# Patient Record
Sex: MALE | Race: WHITE | NOT HISPANIC OR LATINO | Employment: FULL TIME | URBAN - NONMETROPOLITAN AREA
[De-identification: names, ages, dates, MRNs, and addresses within clinical notes are randomized per-mention and may not be internally consistent; named-entity substitution may affect disease eponyms.]

---

## 2023-11-29 ENCOUNTER — APPOINTMENT (OUTPATIENT)
Dept: CT IMAGING | Facility: HOSPITAL | Age: 48
End: 2023-11-29
Payer: COMMERCIAL

## 2023-11-29 ENCOUNTER — HOSPITAL ENCOUNTER (EMERGENCY)
Facility: HOSPITAL | Age: 48
Discharge: HOME | End: 2023-11-29
Attending: EMERGENCY MEDICINE
Payer: COMMERCIAL

## 2023-11-29 VITALS
TEMPERATURE: 98.4 F | HEIGHT: 70 IN | WEIGHT: 230 LBS | HEART RATE: 80 BPM | DIASTOLIC BLOOD PRESSURE: 74 MMHG | OXYGEN SATURATION: 99 % | SYSTOLIC BLOOD PRESSURE: 153 MMHG | BODY MASS INDEX: 32.93 KG/M2 | RESPIRATION RATE: 18 BRPM

## 2023-11-29 DIAGNOSIS — M54.50 RIGHT LOW BACK PAIN, UNSPECIFIED CHRONICITY, UNSPECIFIED WHETHER SCIATICA PRESENT: Primary | ICD-10-CM

## 2023-11-29 LAB
ALBUMIN SERPL-MCNC: 4.5 G/DL (ref 3.5–5.2)
ALBUMIN/GLOB SERPL: 1.6 G/DL
ALP SERPL-CCNC: 62 U/L (ref 39–117)
ALT SERPL W P-5'-P-CCNC: 46 U/L (ref 1–41)
ANION GAP SERPL CALCULATED.3IONS-SCNC: 11 MMOL/L (ref 5–15)
AST SERPL-CCNC: 29 U/L (ref 1–40)
BASOPHILS # BLD AUTO: 0.07 10*3/MM3 (ref 0–0.2)
BASOPHILS NFR BLD AUTO: 0.7 % (ref 0–1.5)
BILIRUB SERPL-MCNC: 0.5 MG/DL (ref 0–1.2)
BILIRUB UR QL STRIP: NEGATIVE
BUN SERPL-MCNC: 19 MG/DL (ref 6–20)
BUN/CREAT SERPL: 13.8 (ref 7–25)
CALCIUM SPEC-SCNC: 9.8 MG/DL (ref 8.6–10.5)
CHLORIDE SERPL-SCNC: 103 MMOL/L (ref 98–107)
CLARITY UR: CLEAR
CO2 SERPL-SCNC: 25 MMOL/L (ref 22–29)
COLOR UR: YELLOW
CREAT SERPL-MCNC: 1.38 MG/DL (ref 0.76–1.27)
CRP SERPL-MCNC: <0.3 MG/DL (ref 0–0.5)
DEPRECATED RDW RBC AUTO: 48.5 FL (ref 37–54)
EGFRCR SERPLBLD CKD-EPI 2021: 62.7 ML/MIN/1.73
EOSINOPHIL # BLD AUTO: 0.28 10*3/MM3 (ref 0–0.4)
EOSINOPHIL NFR BLD AUTO: 2.7 % (ref 0.3–6.2)
ERYTHROCYTE [DISTWIDTH] IN BLOOD BY AUTOMATED COUNT: 14 % (ref 12.3–15.4)
ERYTHROCYTE [SEDIMENTATION RATE] IN BLOOD: 4 MM/HR (ref 0–15)
GLOBULIN UR ELPH-MCNC: 2.8 GM/DL
GLUCOSE SERPL-MCNC: 120 MG/DL (ref 65–99)
GLUCOSE UR STRIP-MCNC: NEGATIVE MG/DL
HCT VFR BLD AUTO: 56.7 % (ref 37.5–51)
HGB BLD-MCNC: 18.5 G/DL (ref 13–17.7)
HGB UR QL STRIP.AUTO: NEGATIVE
HOLD SPECIMEN: NORMAL
HOLD SPECIMEN: NORMAL
IMM GRANULOCYTES # BLD AUTO: 0.02 10*3/MM3 (ref 0–0.05)
IMM GRANULOCYTES NFR BLD AUTO: 0.2 % (ref 0–0.5)
KETONES UR QL STRIP: NEGATIVE
LEUKOCYTE ESTERASE UR QL STRIP.AUTO: NEGATIVE
LYMPHOCYTES # BLD AUTO: 1.78 10*3/MM3 (ref 0.7–3.1)
LYMPHOCYTES NFR BLD AUTO: 17.1 % (ref 19.6–45.3)
MCH RBC QN AUTO: 30.4 PG (ref 26.6–33)
MCHC RBC AUTO-ENTMCNC: 32.6 G/DL (ref 31.5–35.7)
MCV RBC AUTO: 93.1 FL (ref 79–97)
MONOCYTES # BLD AUTO: 0.6 10*3/MM3 (ref 0.1–0.9)
MONOCYTES NFR BLD AUTO: 5.8 % (ref 5–12)
NEUTROPHILS NFR BLD AUTO: 7.68 10*3/MM3 (ref 1.7–7)
NEUTROPHILS NFR BLD AUTO: 73.5 % (ref 42.7–76)
NITRITE UR QL STRIP: NEGATIVE
NRBC BLD AUTO-RTO: 0 /100 WBC (ref 0–0.2)
PH UR STRIP.AUTO: 6 [PH] (ref 5–8)
PLATELET # BLD AUTO: 203 10*3/MM3 (ref 140–450)
PMV BLD AUTO: 11.2 FL (ref 6–12)
POTASSIUM SERPL-SCNC: 4.1 MMOL/L (ref 3.5–5.2)
PROT SERPL-MCNC: 7.3 G/DL (ref 6–8.5)
PROT UR QL STRIP: NEGATIVE
RBC # BLD AUTO: 6.09 10*6/MM3 (ref 4.14–5.8)
SODIUM SERPL-SCNC: 139 MMOL/L (ref 136–145)
SP GR UR STRIP: 1.02 (ref 1–1.03)
UROBILINOGEN UR QL STRIP: NORMAL
WBC NRBC COR # BLD AUTO: 10.43 10*3/MM3 (ref 3.4–10.8)
WHOLE BLOOD HOLD COAG: NORMAL
WHOLE BLOOD HOLD SPECIMEN: NORMAL

## 2023-11-29 PROCEDURE — 72131 CT LUMBAR SPINE W/O DYE: CPT

## 2023-11-29 PROCEDURE — 85652 RBC SED RATE AUTOMATED: CPT | Performed by: PHYSICIAN ASSISTANT

## 2023-11-29 PROCEDURE — 80053 COMPREHEN METABOLIC PANEL: CPT | Performed by: PHYSICIAN ASSISTANT

## 2023-11-29 PROCEDURE — 96372 THER/PROPH/DIAG INJ SC/IM: CPT

## 2023-11-29 PROCEDURE — 81003 URINALYSIS AUTO W/O SCOPE: CPT | Performed by: PHYSICIAN ASSISTANT

## 2023-11-29 PROCEDURE — 72131 CT LUMBAR SPINE W/O DYE: CPT | Performed by: RADIOLOGY

## 2023-11-29 PROCEDURE — 86140 C-REACTIVE PROTEIN: CPT | Performed by: PHYSICIAN ASSISTANT

## 2023-11-29 PROCEDURE — 85025 COMPLETE CBC W/AUTO DIFF WBC: CPT | Performed by: PHYSICIAN ASSISTANT

## 2023-11-29 PROCEDURE — 36415 COLL VENOUS BLD VENIPUNCTURE: CPT

## 2023-11-29 PROCEDURE — 74176 CT ABD & PELVIS W/O CONTRAST: CPT

## 2023-11-29 PROCEDURE — 25010000002 KETOROLAC TROMETHAMINE PER 15 MG: Performed by: EMERGENCY MEDICINE

## 2023-11-29 PROCEDURE — 63710000001 ONDANSETRON ODT 4 MG TABLET DISPERSIBLE: Performed by: EMERGENCY MEDICINE

## 2023-11-29 PROCEDURE — 99284 EMERGENCY DEPT VISIT MOD MDM: CPT

## 2023-11-29 PROCEDURE — 25010000002 MORPHINE PER 10 MG: Performed by: EMERGENCY MEDICINE

## 2023-11-29 PROCEDURE — 74176 CT ABD & PELVIS W/O CONTRAST: CPT | Performed by: RADIOLOGY

## 2023-11-29 RX ORDER — ONDANSETRON 4 MG/1
4 TABLET, ORALLY DISINTEGRATING ORAL ONCE
Status: COMPLETED | OUTPATIENT
Start: 2023-11-29 | End: 2023-11-29

## 2023-11-29 RX ORDER — KETOROLAC TROMETHAMINE 30 MG/ML
30 INJECTION, SOLUTION INTRAMUSCULAR; INTRAVENOUS ONCE
Status: COMPLETED | OUTPATIENT
Start: 2023-11-29 | End: 2023-11-29

## 2023-11-29 RX ORDER — ORPHENADRINE CITRATE 100 MG/1
100 TABLET, EXTENDED RELEASE ORAL 2 TIMES DAILY PRN
Qty: 14 TABLET | Refills: 0 | Status: SHIPPED | OUTPATIENT
Start: 2023-11-29

## 2023-11-29 RX ADMIN — KETOROLAC TROMETHAMINE 30 MG: 30 INJECTION, SOLUTION INTRAMUSCULAR; INTRAVENOUS at 16:16

## 2023-11-29 RX ADMIN — ONDANSETRON 4 MG: 4 TABLET, ORALLY DISINTEGRATING ORAL at 16:16

## 2023-11-29 RX ADMIN — MORPHINE SULFATE 4 MG: 4 INJECTION, SOLUTION INTRAMUSCULAR; INTRAVENOUS at 16:16

## 2023-11-29 NOTE — Clinical Note
[facility] EMERGENCY DEPARTMENT  [address] [facility] [address]  Phone: [phone]    Ronni Salinas was seen and treated in our emergency department on 11/29/2023.  He may return to work on 12/01/2023.         Thank you for choosing [facility].    Ronaldo Finley PA-C

## 2023-11-29 NOTE — ED PROVIDER NOTES
Subjective   History of Present Illness  48-year-old male with no known past medical history presents to the emergency room with lower right-sided back pain for the past several months and worsening over the past couple of weeks.  Patient states the pain is so severe at night he is unable to sleep secondary to this pain.  He states upon lying down is when the pain starts, however when he is up and moving throughout the day the pain gets better.  He denies any previous back surgeries or trauma.  He denies any urinary retention, fecal incontinence, saddle anesthesia, or lower extremity weakness.  Denies any history of nephrolithiasis or hematuria.  Aggravating factors include lying down.  Alleviating after his include movement.  Does report to taking steroids and NSAIDs, however states that does not necessarily help his pain.  Denies any other complaints or concerns at this time.    History provided by:  Patient   used: No        Review of Systems   Constitutional: Negative.  Negative for fever.   HENT: Negative.     Respiratory: Negative.     Cardiovascular: Negative.  Negative for chest pain.   Gastrointestinal: Negative.  Negative for abdominal pain.   Endocrine: Negative.    Genitourinary: Negative.  Negative for dysuria.   Musculoskeletal:  Positive for back pain.   Skin: Negative.    Neurological: Negative.    Psychiatric/Behavioral: Negative.     All other systems reviewed and are negative.      No past medical history on file.    Allergies   Allergen Reactions    Codeine Shortness Of Breath    Penicillins Other (See Comments)     Childhood allergy       No past surgical history on file.    No family history on file.    Social History     Socioeconomic History    Marital status:            Objective   Physical Exam  Vitals and nursing note reviewed.   Constitutional:       General: He is not in acute distress.     Appearance: He is well-developed. He is not diaphoretic.   HENT:       Head: Normocephalic and atraumatic.      Right Ear: External ear normal.      Left Ear: External ear normal.      Nose: Nose normal.   Eyes:      Conjunctiva/sclera: Conjunctivae normal.      Pupils: Pupils are equal, round, and reactive to light.   Neck:      Vascular: No JVD.      Trachea: No tracheal deviation.   Cardiovascular:      Rate and Rhythm: Normal rate and regular rhythm.      Heart sounds: Normal heart sounds. No murmur heard.  Pulmonary:      Effort: Pulmonary effort is normal. No respiratory distress.      Breath sounds: Normal breath sounds. No wheezing.   Abdominal:      General: Bowel sounds are normal. There is no distension.      Palpations: Abdomen is soft.      Tenderness: There is no abdominal tenderness. There is right CVA tenderness. There is no left CVA tenderness.   Musculoskeletal:         General: No deformity. Normal range of motion.      Cervical back: Normal, normal range of motion and neck supple.      Thoracic back: Normal.      Lumbar back: Tenderness present. Negative right straight leg raise test and negative left straight leg raise test.        Back:    Skin:     General: Skin is warm and dry.      Coloration: Skin is not pale.      Findings: No erythema or rash.   Neurological:      Mental Status: He is alert and oriented to person, place, and time.      Cranial Nerves: No cranial nerve deficit.   Psychiatric:         Behavior: Behavior normal.         Thought Content: Thought content normal.         Procedures           ED Course  ED Course as of 11/29/23 1841 Wed Nov 29, 2023   1637 CT Abdomen Pelvis Without Contrast [TK]   1637 CT Lumbar Spine Without Contrast [TK]      ED Course User Index  [TK] Ronaldo Finley PA-C                                   Results for orders placed or performed during the hospital encounter of 11/29/23   Comprehensive Metabolic Panel    Specimen: Hand, Right; Blood   Result Value Ref Range    Glucose 120 (H) 65 - 99 mg/dL    BUN 19 6 - 20  mg/dL    Creatinine 1.38 (H) 0.76 - 1.27 mg/dL    Sodium 139 136 - 145 mmol/L    Potassium 4.1 3.5 - 5.2 mmol/L    Chloride 103 98 - 107 mmol/L    CO2 25.0 22.0 - 29.0 mmol/L    Calcium 9.8 8.6 - 10.5 mg/dL    Total Protein 7.3 6.0 - 8.5 g/dL    Albumin 4.5 3.5 - 5.2 g/dL    ALT (SGPT) 46 (H) 1 - 41 U/L    AST (SGOT) 29 1 - 40 U/L    Alkaline Phosphatase 62 39 - 117 U/L    Total Bilirubin 0.5 0.0 - 1.2 mg/dL    Globulin 2.8 gm/dL    A/G Ratio 1.6 g/dL    BUN/Creatinine Ratio 13.8 7.0 - 25.0    Anion Gap 11.0 5.0 - 15.0 mmol/L    eGFR 62.7 >60.0 mL/min/1.73   Urinalysis With Culture If Indicated - Urine, Clean Catch    Specimen: Urine, Clean Catch   Result Value Ref Range    Color, UA Yellow Yellow, Straw    Appearance, UA Clear Clear    pH, UA 6.0 5.0 - 8.0    Specific Gravity, UA 1.018 1.005 - 1.030    Glucose, UA Negative Negative    Ketones, UA Negative Negative    Bilirubin, UA Negative Negative    Blood, UA Negative Negative    Protein, UA Negative Negative    Leuk Esterase, UA Negative Negative    Nitrite, UA Negative Negative    Urobilinogen, UA 1.0 E.U./dL 0.2 - 1.0 E.U./dL   C-reactive Protein    Specimen: Hand, Right; Blood   Result Value Ref Range    C-Reactive Protein <0.30 0.00 - 0.50 mg/dL   Sedimentation Rate    Specimen: Hand, Right; Blood   Result Value Ref Range    Sed Rate 4 0 - 15 mm/hr   CBC Auto Differential    Specimen: Hand, Right; Blood   Result Value Ref Range    WBC 10.43 3.40 - 10.80 10*3/mm3    RBC 6.09 (H) 4.14 - 5.80 10*6/mm3    Hemoglobin 18.5 (H) 13.0 - 17.7 g/dL    Hematocrit 56.7 (H) 37.5 - 51.0 %    MCV 93.1 79.0 - 97.0 fL    MCH 30.4 26.6 - 33.0 pg    MCHC 32.6 31.5 - 35.7 g/dL    RDW 14.0 12.3 - 15.4 %    RDW-SD 48.5 37.0 - 54.0 fl    MPV 11.2 6.0 - 12.0 fL    Platelets 203 140 - 450 10*3/mm3    Neutrophil % 73.5 42.7 - 76.0 %    Lymphocyte % 17.1 (L) 19.6 - 45.3 %    Monocyte % 5.8 5.0 - 12.0 %    Eosinophil % 2.7 0.3 - 6.2 %    Basophil % 0.7 0.0 - 1.5 %    Immature Grans %  0.2 0.0 - 0.5 %    Neutrophils, Absolute 7.68 (H) 1.70 - 7.00 10*3/mm3    Lymphocytes, Absolute 1.78 0.70 - 3.10 10*3/mm3    Monocytes, Absolute 0.60 0.10 - 0.90 10*3/mm3    Eosinophils, Absolute 0.28 0.00 - 0.40 10*3/mm3    Basophils, Absolute 0.07 0.00 - 0.20 10*3/mm3    Immature Grans, Absolute 0.02 0.00 - 0.05 10*3/mm3    nRBC 0.0 0.0 - 0.2 /100 WBC   Green Top (Gel)   Result Value Ref Range    Extra Tube Hold for add-ons.    Lavender Top   Result Value Ref Range    Extra Tube hold for add-on    Gold Top - SST   Result Value Ref Range    Extra Tube Hold for add-ons.    Light Blue Top   Result Value Ref Range    Extra Tube Hold for add-ons.        CT Lumbar Spine Without Contrast   Final Result     No acute findings in the lumbar spine.           This report was finalized on 11/29/2023 4:12 PM by Dr. Pool Sunshine MD.          CT Abdomen Pelvis Without Contrast   Final Result     No acute findings in the abdomen or pelvis.           This report was finalized on 11/29/2023 4:11 PM by Dr. Pool Sunshine MD.                        Medical Decision Making  48-year-old male with no known past medical history presents to the emergency room with lower right-sided back pain for the past several months and worsening over the past couple of weeks.  Patient states the pain is so severe at night he is unable to sleep secondary to this pain.  He states upon lying down is when the pain starts, however when he is up and moving throughout the day the pain gets better.  He denies any previous back surgeries or trauma.  He denies any urinary retention, fecal incontinence, saddle anesthesia, or lower extremity weakness.  Denies any history of nephrolithiasis or hematuria.  Aggravating factors include lying down.  Alleviating after his include movement.  Does report to taking steroids and NSAIDs, however states that does not necessarily help his pain.  Denies any other complaints or concerns at this time.      Problems  Addressed:  Right low back pain, unspecified chronicity, unspecified whether sciatica present: complicated acute illness or injury    Amount and/or Complexity of Data Reviewed  Labs: ordered. Decision-making details documented in ED Course.  Radiology: ordered. Decision-making details documented in ED Course.    Risk  Prescription drug management.        Final diagnoses:   Right low back pain, unspecified chronicity, unspecified whether sciatica present       ED Disposition  ED Disposition       ED Disposition   Discharge    Condition   Stable    Comment   --               Nikhil Forrest MD  [address] [place] [address] [address] [place] [address] [phone]    In 2 days           Medication List        New Prescriptions      orphenadrine 100 MG 12 hr tablet  Commonly known as: NORFLEX  Take 1 tablet by mouth 2 (Two) Times a Day As Needed for Muscle Spasms or Mild Pain.               Where to Get Your Medications        These medications were sent to [id number] - [place], [place] - [address] [facility] - [phone]  - [phone]   [address] [place] [address]      Phone: [phone]   orphenadrine 100 MG 12 hr tablet            Ronaldo Finley, PAHiramC  11/29/23 4407

## 2024-10-07 ENCOUNTER — HOSPITAL ENCOUNTER (OUTPATIENT)
Dept: GENERAL RADIOLOGY | Facility: HOSPITAL | Age: 49
Discharge: HOME | End: 2024-10-07
Admitting: FAMILY MEDICINE
Payer: COMMERCIAL

## 2024-10-07 ENCOUNTER — OFFICE VISIT (OUTPATIENT)
Dept: ORTHOPEDIC SURGERY | Facility: CLINIC | Age: 49
End: 2024-10-07
Payer: COMMERCIAL

## 2024-10-07 VITALS
DIASTOLIC BLOOD PRESSURE: 89 MMHG | SYSTOLIC BLOOD PRESSURE: 130 MMHG | WEIGHT: 259.8 LBS | OXYGEN SATURATION: 98 % | BODY MASS INDEX: 37.19 KG/M2 | HEART RATE: 64 BPM | HEIGHT: 70 IN

## 2024-10-07 DIAGNOSIS — M25.511 CHRONIC RIGHT SHOULDER PAIN: Primary | ICD-10-CM

## 2024-10-07 DIAGNOSIS — S46.011A TRAUMATIC TEAR OF RIGHT ROTATOR CUFF, UNSPECIFIED TEAR EXTENT, INITIAL ENCOUNTER: ICD-10-CM

## 2024-10-07 DIAGNOSIS — M25.511 RIGHT SHOULDER PAIN, UNSPECIFIED CHRONICITY: ICD-10-CM

## 2024-10-07 DIAGNOSIS — G89.29 CHRONIC RIGHT SHOULDER PAIN: Primary | ICD-10-CM

## 2024-10-07 DIAGNOSIS — M75.51 CHRONIC SHOULDER BURSITIS, RIGHT: ICD-10-CM

## 2024-10-07 DIAGNOSIS — M25.611 SHOULDER JOINT STIFFNESS, RIGHT: ICD-10-CM

## 2024-10-07 PROBLEM — I10 HTN (HYPERTENSION): Status: ACTIVE | Noted: 2024-10-07

## 2024-10-07 PROBLEM — E03.9 HYPOTHYROIDISM (ACQUIRED): Status: ACTIVE | Noted: 2024-10-07

## 2024-10-07 PROBLEM — K44.9 HIATAL HERNIA: Status: ACTIVE | Noted: 2024-10-07

## 2024-10-07 PROBLEM — E78.5 DYSLIPIDEMIA: Status: ACTIVE | Noted: 2024-10-07

## 2024-10-07 PROBLEM — K21.9 ACID REFLUX: Status: ACTIVE | Noted: 2024-10-07

## 2024-10-07 PROBLEM — F43.22 ADJUSTMENT DISORDER WITH ANXIETY: Status: ACTIVE | Noted: 2024-10-07

## 2024-10-07 PROCEDURE — 99204 OFFICE O/P NEW MOD 45 MIN: CPT | Performed by: FAMILY MEDICINE

## 2024-10-07 PROCEDURE — 20610 DRAIN/INJ JOINT/BURSA W/O US: CPT | Performed by: FAMILY MEDICINE

## 2024-10-07 PROCEDURE — 73030 X-RAY EXAM OF SHOULDER: CPT

## 2024-10-07 RX ORDER — NAPROXEN 500 MG/1
500 TABLET ORAL 2 TIMES DAILY PRN
Qty: 60 TABLET | Refills: 1 | Status: SHIPPED | OUTPATIENT
Start: 2024-10-07

## 2024-10-07 RX ORDER — LEVOTHYROXINE SODIUM 50 UG/1
100 CAPSULE ORAL
COMMUNITY

## 2024-10-07 RX ORDER — DIAZEPAM 10 MG
5-10 TABLET ORAL 2 TIMES DAILY PRN
COMMUNITY

## 2024-10-07 RX ORDER — LISINOPRIL 20 MG/1
TABLET ORAL
COMMUNITY
Start: 2024-08-29

## 2024-10-07 RX ORDER — ATORVASTATIN CALCIUM 20 MG/1
20 TABLET, FILM COATED ORAL DAILY
COMMUNITY

## 2024-10-07 RX ORDER — METHYLPREDNISOLONE ACETATE 80 MG/ML
80 INJECTION, SUSPENSION INTRA-ARTICULAR; INTRALESIONAL; INTRAMUSCULAR; SOFT TISSUE
Status: COMPLETED | OUTPATIENT
Start: 2024-10-07 | End: 2024-10-07

## 2024-10-07 RX ORDER — LIDOCAINE HYDROCHLORIDE 10 MG/ML
5 INJECTION, SOLUTION EPIDURAL; INFILTRATION; INTRACAUDAL; PERINEURAL
Status: COMPLETED | OUTPATIENT
Start: 2024-10-07 | End: 2024-10-07

## 2024-10-07 RX ORDER — CELECOXIB 200 MG/1
200 CAPSULE ORAL DAILY
COMMUNITY
End: 2024-10-07 | Stop reason: ALTCHOICE

## 2024-10-07 RX ORDER — OMEPRAZOLE 40 MG/1
CAPSULE, DELAYED RELEASE ORAL
COMMUNITY
Start: 2024-09-30

## 2024-10-07 RX ADMIN — LIDOCAINE HYDROCHLORIDE 5 ML: 10 INJECTION, SOLUTION EPIDURAL; INFILTRATION; INTRACAUDAL; PERINEURAL at 16:39

## 2024-10-07 RX ADMIN — METHYLPREDNISOLONE ACETATE 80 MG: 80 INJECTION, SUSPENSION INTRA-ARTICULAR; INTRALESIONAL; INTRAMUSCULAR; SOFT TISSUE at 16:39

## 2024-10-07 NOTE — PROGRESS NOTES
New Patient Visit      Patient: Ronni Salinas  YOB: 1975  Date of Encounter: 10/07/2024  PCP: Kim Best APRN  Referring Provider: NIURKA Naidu     Subjective   Ronni Salinas is a 48 y.o. male who presents to the office today for evaluation of Initial Evaluation and Pain of the Right Shoulder      Chief Complaint   Patient presents with    Right Shoulder - Initial Evaluation, Pain       History of Present Illness  The patient presents for evaluation of right shoulder pain.    He experiences pain in his right shoulder when lifting or rotating it externally. This discomfort has been present since he was 11 years old, when he injured his arm while playing [employer] league. Over the years, the pain has progressively worsened, limiting his ability to throw a ball. He reports daily pain despite avoiding activities that exacerbate his shoulder pain, such as arm wrestling or throwing a ball.    He has sought medical attention for this issue since his youth. Initial diagnoses suggested a pulled muscle. However, an MRI conducted at Dr. Harris' office when he was much younger revealed a torn rotator cuff. Another MRI performed by Dr. Dubin in [place] identified bone spurs about 12 years ago. He has received cortisone injections every three months, which sometimes provide immediate relief and other times take several hours or even a day to alleviate the pain. These injections typically offer about three weeks of relief.    He also reports elbow pain and neck soreness, which he attributes to his shoulder pain. He has not undergone physical therapy for his arm but has tried ibuprofen and Celebrex, neither of which provided significant relief. Pain medications have caused him stomach discomfort, necessitating the use of Zofran.    He has been diagnosed with lipomas in his back and head, which are being monitored. He is currently taking thyroid medication and has a history of blood pressure and cholesterol  issues, as well as low thyroid function. He has also experienced anxiety for several years.    Patient Active Problem List   Diagnosis    HTN (hypertension)    Hypothyroidism (acquired)    Dyslipidemia    Adjustment disorder with anxiety    Hiatal hernia    Acid reflux       Past Medical History:   Diagnosis Date    Acid reflux     Hiatal hernia     High cholesterol     Hypertension     Thyroid condition        Past Surgical History:   Procedure Laterality Date    CARDIAC CATHETERIZATION      CARPAL TUNNEL RELEASE Right     ENDOSCOPY      FOOT SURGERY      HAND SURGERY Right     TONSILLECTOMY         History reviewed. No pertinent family history.    Social History     Socioeconomic History    Marital status:    Tobacco Use    Smoking status: Never    Smokeless tobacco: Current     Types: Snuff   Vaping Use    Vaping status: Never Used   Substance and Sexual Activity    Alcohol use: Yes    Drug use: Never    Sexual activity: Defer       Current Outpatient Medications   Medication Sig Dispense Refill    atorvastatin (LIPITOR) 20 MG tablet Take 1 tablet by mouth Daily.      diazePAM (VALIUM) 10 MG tablet Take 0.5-1 tablets by mouth 2 (Two) Times a Day As Needed.      levothyroxine sodium (TIROSINT) 50 MCG capsule Take 2 capsules by mouth.      lisinopril (PRINIVIL,ZESTRIL) 20 MG tablet Take 1 tablet every day by oral route for 30 days, for BP.      omeprazole (priLOSEC) 40 MG capsule Take by oral route for 30 days.      naproxen (NAPROSYN) 500 MG tablet Take 1 tablet by mouth 2 (Two) Times a Day As Needed for Moderate Pain. With food 60 tablet 1    orphenadrine (NORFLEX) 100 MG 12 hr tablet Take 1 tablet by mouth 2 (Two) Times a Day As Needed for Muscle Spasms or Mild Pain. (Patient not taking: Reported on 10/7/2024) 14 tablet 0     No current facility-administered medications for this visit.       Allergies   Allergen Reactions    Codeine Shortness Of Breath    Penicillins Other (See Comments)     Childhood  "allergy       Vitals:   /89 (BP Location: Left arm, Patient Position: Sitting, Cuff Size: Adult)   Pulse 64   Ht 177.8 cm (70\")   Wt 118 kg (259 lb 12.8 oz)   SpO2 98%   BMI 37.28 kg/m²           Visit Vitals  /89 (BP Location: Left arm, Patient Position: Sitting, Cuff Size: Adult)   Pulse 64   Ht 177.8 cm (70\")   Wt 118 kg (259 lb 12.8 oz)   SpO2 98%   BMI 37.28 kg/m²     48 y.o.male     Review of Systems   Constitutional:  Positive for activity change. Negative for fever.   Respiratory:  Negative for shortness of breath and wheezing.    Cardiovascular:  Negative for chest pain.   Musculoskeletal:  Positive for arthralgias, myalgias and neck pain.   Skin:  Negative for color change and wound.   Neurological:  Negative for weakness and numbness.       Physical Exam  Vitals and nursing note reviewed.   Constitutional:       General: He is not in acute distress.     Appearance: Normal appearance.   Pulmonary:      Effort: Pulmonary effort is normal. No respiratory distress.   Musculoskeletal:      Cervical back: Spasms and tenderness present. No bony tenderness. No pain with movement. Decreased range of motion.      Comments: Negative Spurling maneuver   Skin:     General: Skin is warm and dry.      Findings: No erythema.   Neurological:      General: No focal deficit present.      Mental Status: He is alert.      Sensory: No sensory deficit.      Motor: No weakness.       Physical Exam  Right shoulder exam reveals tenderness to palpation over coracoid and anterior shoulder joint. Tenderness is noted on posterior joint and rotator cuff interval. Restricted active range of motion with pain in all directions, essentially full passive flexion and restricted abduction and external rotation with pain. Pain without weakness on testing of supraspinatus. Severe pain is observed on [place]'s sign. Mild pain on testing of infraspinatus. Painless testing of subscapularis, biceps, and triceps. Pain is noted on " the AC crossover. Painless impingement signs, however, significant pain on bringing the arm back down to neutral position. Negative Speed's test. Neurovascularly intact arm with intact pinch  and intrinsic strength, 2+ radial pulses and intact deep tendon reflexes. Intact sensation.    Radiology Results:    XR Shoulder 2+ View Right  Narrative: EXAM:    XR Right Shoulder Complete, 2 or More Views     EXAM DATE:    10/7/2024 10:29 AM     CLINICAL HISTORY:    right shoulder pain; M25.511-Pain in right shoulder     TECHNIQUE:    Two or more views of the right shoulder.     COMPARISON:    No relevant prior studies available.     FINDINGS:    BONES/JOINTS:  Unremarkable.  No acute fracture.  No dislocation.    SOFT TISSUES:  Unremarkable.     Impression:   No acute findings in the right shoulder.        This report was finalized on 10/7/2024 10:55 AM by Dr. Sherman Dahl MD.       - Large Joint Arthrocentesis: R subacromial bursa on 10/7/2024 4:39 PM  Indications: pain and diagnostic evaluation  Details: 22 G needle, posterior approach  Medications: 5 mL lidocaine PF 1% 1 %; 80 mg methylPREDNISolone acetate 80 MG/ML  Outcome: tolerated well, no immediate complications    Injection site in the posterior shoulder was cleaned with alcohol and iodine.  Anesthesia with ethyl chloride.  Then using sterile technique the subacromial space was accessed with a 22-gauge 1.5 inch needle injected with 5 cc 1% lidocaine without epinephrine and 80 mg methylprednisolone.  Injection site was covered with sterile bandage.  There were no immediate adverse effects and negligible blood loss..  Follow-up care and precautions were discussed.      Procedure, treatment alternatives, risks and benefits explained, specific risks discussed. Consent was given by the patient. Immediately prior to procedure a time out was called to verify the correct patient, procedure, equipment, support staff and site/side marked as required. Patient was prepped  and draped in the usual sterile fashion.          Results  Imaging  X-rays of the right shoulder showed no significant arthritic change, but some calcifications were observed in the tendons.     Diagnoses and all orders for this visit:    1. Chronic right shoulder pain (Primary)  -     XR Shoulder 2+ View Right; Future  -     Ambulatory Referral to Physical Therapy for Evaluation & Treatment  -     naproxen (NAPROSYN) 500 MG tablet; Take 1 tablet by mouth 2 (Two) Times a Day As Needed for Moderate Pain. With food  Dispense: 60 tablet; Refill: 1    2. Traumatic tear of right rotator cuff, unspecified tear extent, initial encounter  -     Ambulatory Referral to Physical Therapy for Evaluation & Treatment  -     naproxen (NAPROSYN) 500 MG tablet; Take 1 tablet by mouth 2 (Two) Times a Day As Needed for Moderate Pain. With food  Dispense: 60 tablet; Refill: 1    3. Shoulder joint stiffness, right  -     Ambulatory Referral to Physical Therapy for Evaluation & Treatment  -     naproxen (NAPROSYN) 500 MG tablet; Take 1 tablet by mouth 2 (Two) Times a Day As Needed for Moderate Pain. With food  Dispense: 60 tablet; Refill: 1    4. Chronic shoulder bursitis, right  -     Ambulatory Referral to Physical Therapy for Evaluation & Treatment  -     naproxen (NAPROSYN) 500 MG tablet; Take 1 tablet by mouth 2 (Two) Times a Day As Needed for Moderate Pain. With food  Dispense: 60 tablet; Refill: 1    Other orders  -     - Large Joint Arthrocentesis        Assessment & Plan  1 chronic right shoulder pain likely related to combination of rotator cuff tendinitis, partial tearing, tendon calcifications and possibly labrum injury.  Discussed treatment options and patient will participate in physical therapy as well as a home exercise program I have given him.  Will discontinue Celebrex and try naproxen instead but patient does have borderline low renal function.  He is due to get new labs this week and will bring a copy of them.  His  dose may need to be adjusted or discontinued entirely depending on renal function.  Patient received a steroid injection of the right shoulder with Depo-Medrol with improvement in pain and will follow-up in 2 weeks for reevaluation.            Discussion:    MEDS ORDERED DURING VISIT:  New Medications Ordered This Visit   Medications    naproxen (NAPROSYN) 500 MG tablet     Sig: Take 1 tablet by mouth 2 (Two) Times a Day As Needed for Moderate Pain. With food     Dispense:  60 tablet     Refill:  1     MEDICATION ISSUES:  Discussed medication options and treatment plan of prescribed medication as well as the risks, benefits, and side effects including potential falls, possible impaired driving and metabolic adversities among others. Patient is agreeable to call the office with any worsening of symptoms or onset of side effects. Patient is agreeable to call 911 or go to the nearest ER should he/she begin having SI/HI.         This document has been electronically signed by Valdez Saab DO   October 7, 2024 16:41 EDT    Patient or patient representative verbalized consent for the use of Ambient Listening during the visit with  Valdez Saab DO for chart documentation. 10/7/2024  16:41 EDT

## 2024-10-11 ENCOUNTER — PATIENT ROUNDING (BHMG ONLY) (OUTPATIENT)
Dept: ORTHOPEDIC SURGERY | Facility: CLINIC | Age: 49
End: 2024-10-11
Payer: COMMERCIAL

## 2024-10-11 NOTE — PROGRESS NOTES
October 11, 2024    Hello, may I speak with Ronni Salinas?    My name is LIZBETH      I am  with [facility]  [address]  [facility] [address] [phone].    Before we get started may I verify your date of birth? 1975    I am calling to officially welcome you to our practice and ask about your recent visit. Is this a good time to talk? yes    Tell me about your visit with us. What things went well?  ALL WAS GREAT, NO COMPLAINTS       We're always looking for ways to make our patients' experiences even better. Do you have recommendations on ways we may improve?  no    Overall were you satisfied with your first visit to our practice? yes       I appreciate you taking the time to speak with me today. Is there anything else I can do for you? no      Thank you, and have a great day.

## 2024-10-24 ENCOUNTER — OFFICE VISIT (OUTPATIENT)
Dept: ORTHOPEDIC SURGERY | Facility: CLINIC | Age: 49
End: 2024-10-24
Payer: COMMERCIAL

## 2024-10-24 VITALS
DIASTOLIC BLOOD PRESSURE: 94 MMHG | BODY MASS INDEX: 37.09 KG/M2 | HEART RATE: 82 BPM | HEIGHT: 70 IN | SYSTOLIC BLOOD PRESSURE: 138 MMHG | OXYGEN SATURATION: 94 % | WEIGHT: 259.12 LBS

## 2024-10-24 DIAGNOSIS — M25.511 CHRONIC RIGHT SHOULDER PAIN: ICD-10-CM

## 2024-10-24 DIAGNOSIS — G89.29 CHRONIC RIGHT SHOULDER PAIN: ICD-10-CM

## 2024-10-24 DIAGNOSIS — M25.611 SHOULDER JOINT STIFFNESS, RIGHT: ICD-10-CM

## 2024-10-24 DIAGNOSIS — M75.51 CHRONIC SHOULDER BURSITIS, RIGHT: ICD-10-CM

## 2024-10-24 DIAGNOSIS — S46.011A TRAUMATIC TEAR OF RIGHT ROTATOR CUFF, UNSPECIFIED TEAR EXTENT, INITIAL ENCOUNTER: Primary | ICD-10-CM

## 2024-10-24 NOTE — PROGRESS NOTES
"Follow Up Visit      Patient: Ronni Salinas  YOB: 1975  Date of Encounter: 10/24/2024  PCP: Kim Best APRN  Referring Provider: No ref. provider found     Subjective   Ronni Salinas is a 49 y.o. male who presents to the office today for evaluation of Pain and Follow-up of the Right Shoulder      Chief Complaint   Patient presents with    Right Shoulder - Pain, Follow-up       HPI  Patient presents for follow-up for chronic right shoulder pain.  Reports that the injection numbed him up for the day but was overall of no help.  He has been attempting physical therapy 3 times a week but states that it flares him up badly after every treatment.  States that as long as he keeps his arm below shoulder height he is usually okay but becomes severe with any elevation above shoulder height or any extension  Patient Active Problem List   Diagnosis    HTN (hypertension)    Hypothyroidism (acquired)    Dyslipidemia    Adjustment disorder with anxiety    Hiatal hernia    Acid reflux       Past Medical History:   Diagnosis Date    Acid reflux     Hiatal hernia     High cholesterol     Hypertension     Thyroid condition        Allergies   Allergen Reactions    Codeine Shortness Of Breath    Penicillins Other (See Comments)     Childhood allergy       Vitals:   /94 (BP Location: Left arm, Patient Position: Sitting, Cuff Size: Adult)   Pulse 82   Ht 177.8 cm (70\")   Wt 118 kg (259 lb 1.9 oz)   SpO2 94%   BMI 37.18 kg/m²               Visit Vitals  /94 (BP Location: Left arm, Patient Position: Sitting, Cuff Size: Adult)   Pulse 82   Ht 177.8 cm (70\")   Wt 118 kg (259 lb 1.9 oz)   SpO2 94%   BMI 37.18 kg/m²     49 y.o.male  Physical Exam  Vitals and nursing note reviewed.   Constitutional:       General: He is not in acute distress.     Appearance: Normal appearance. He is obese.   Pulmonary:      Effort: Pulmonary effort is normal. No respiratory distress.   Musculoskeletal:      Comments: Right " shoulder generally restricted range of motion in external rotation flexion abduction with pain as he transitions from below to above the shoulder.  Severe pain on [place] sign.  Mild pain on Corea test.  Painless testing of rotator cuff biceps and triceps.  Neurovascular intact arm   Skin:     General: Skin is warm and dry.      Findings: No erythema.   Neurological:      General: No focal deficit present.      Mental Status: He is alert.      Sensory: No sensory deficit.      Motor: No weakness.         Radiology Results:    XR Shoulder 2+ View Right    Result Date: 10/7/2024    No acute findings in the right shoulder.   This report was finalized on 10/7/2024 10:55 AM by Dr. Sherman Dahl MD.       Assessment & Plan   Diagnoses and all orders for this visit:    1. Traumatic tear of right rotator cuff, unspecified tear extent, initial encounter (Primary)  -     MRI Shoulder Right Without Contrast; Future    2. Chronic right shoulder pain  -     MRI Shoulder Right Without Contrast; Future    3. Chronic shoulder bursitis, right  -     MRI Shoulder Right Without Contrast; Future    4. Shoulder joint stiffness, right  -     MRI Shoulder Right Without Contrast; Future         MEDS ORDERED DURING VISIT:  No orders of the defined types were placed in this encounter.    MEDICATION ISSUES:  Discussed medication options and treatment plan of prescribed medication as well as the risks, benefits, and side effects including potential falls, possible impaired driving and metabolic adversities among others. Patient is agreeable to call the office with any worsening of symptoms or onset of side effects. Patient is agreeable to call 911 or go to the nearest ER should he/she begin having SI/HI.     Discussion:  Patient has a known rotator cuff tear in the right shoulder from when he was much younger and we have been unable to get any of his previous MRI results to confirm this.  However the patient continues to have significant  shoulder pain stiffness and limits to ADLs causing him to be unable to work with his arm above shoulder level or with extended due to severe pain.  His exam is very concerning for labrum tear.  NSAIDs, subacromial steroid injection, or of no help and PT is actually making him flared up badly after each treatment and he is not improving.  At this point he needs a new MRI to fully evaluate the shoulder for possible surgical event intervention.  Patient will pause his physical therapy until after the MRI             This document has been electronically signed by Valdez Saab DO   October 24, 2024 15:03 EDT    Dictated Utilizing Dragon Dictation: Part of this note may be an electronic transcription/translation of spoken language to printed text using the Dragon Dictation System.

## 2024-11-06 ENCOUNTER — TELEPHONE (OUTPATIENT)
Dept: ORTHOPEDIC SURGERY | Facility: CLINIC | Age: 49
End: 2024-11-06
Payer: COMMERCIAL

## 2024-11-06 ENCOUNTER — HOSPITAL ENCOUNTER (OUTPATIENT)
Dept: MRI IMAGING | Facility: HOSPITAL | Age: 49
Discharge: HOME | End: 2024-11-06
Admitting: FAMILY MEDICINE
Payer: COMMERCIAL

## 2024-11-06 DIAGNOSIS — M75.51 CHRONIC SHOULDER BURSITIS, RIGHT: ICD-10-CM

## 2024-11-06 DIAGNOSIS — G89.29 CHRONIC RIGHT SHOULDER PAIN: ICD-10-CM

## 2024-11-06 DIAGNOSIS — M25.511 CHRONIC RIGHT SHOULDER PAIN: ICD-10-CM

## 2024-11-06 DIAGNOSIS — M25.611 SHOULDER JOINT STIFFNESS, RIGHT: ICD-10-CM

## 2024-11-06 DIAGNOSIS — S46.011A TRAUMATIC TEAR OF RIGHT ROTATOR CUFF, UNSPECIFIED TEAR EXTENT, INITIAL ENCOUNTER: ICD-10-CM

## 2024-11-06 PROCEDURE — 73221 MRI JOINT UPR EXTREM W/O DYE: CPT | Performed by: RADIOLOGY

## 2024-11-06 PROCEDURE — 73221 MRI JOINT UPR EXTREM W/O DYE: CPT

## 2024-11-06 NOTE — TELEPHONE ENCOUNTER
Provider: CHARITY    Caller: PATIENT    Phone Number: [phone]    Reason for Call: PATIENT STATES HE HAD HIS MRI DONE THIS MORNING, AND IS ASKING FOR A CALL BACK TO GO OVER RESULTS.

## 2024-11-08 NOTE — PROGRESS NOTES
Please call the patient regarding his  result.  Patient has a partial tear of one of his rotator cuff tendons as well as some arthritic changes and swelling.  Will discuss this further at his follow-up

## 2024-11-11 ENCOUNTER — OFFICE VISIT (OUTPATIENT)
Dept: ORTHOPEDIC SURGERY | Facility: CLINIC | Age: 49
End: 2024-11-11
Payer: COMMERCIAL

## 2024-11-11 VITALS
WEIGHT: 259.1 LBS | SYSTOLIC BLOOD PRESSURE: 135 MMHG | OXYGEN SATURATION: 97 % | BODY MASS INDEX: 37.09 KG/M2 | DIASTOLIC BLOOD PRESSURE: 95 MMHG | HEART RATE: 80 BPM | HEIGHT: 70 IN

## 2024-11-11 DIAGNOSIS — S46.011A TRAUMATIC TEAR OF RIGHT ROTATOR CUFF, UNSPECIFIED TEAR EXTENT, INITIAL ENCOUNTER: Primary | ICD-10-CM

## 2024-11-11 DIAGNOSIS — M75.41 IMPINGEMENT SYNDROME OF RIGHT SHOULDER: ICD-10-CM

## 2024-11-11 DIAGNOSIS — M19.011 OSTEOARTHRITIS OF RIGHT AC (ACROMIOCLAVICULAR) JOINT: ICD-10-CM

## 2024-11-11 DIAGNOSIS — M75.51 CHRONIC SHOULDER BURSITIS, RIGHT: ICD-10-CM

## 2024-11-11 DIAGNOSIS — M25.611 SHOULDER JOINT STIFFNESS, RIGHT: ICD-10-CM

## 2024-11-11 RX ORDER — MELOXICAM 7.5 MG/1
7.5 TABLET ORAL DAILY
Qty: 30 TABLET | Refills: 1 | Status: SHIPPED | OUTPATIENT
Start: 2024-11-11

## 2024-11-11 RX ADMIN — LIDOCAINE HYDROCHLORIDE 5 ML: 10 INJECTION, SOLUTION EPIDURAL; INFILTRATION; INTRACAUDAL; PERINEURAL at 14:30

## 2024-11-11 RX ADMIN — KETOROLAC TROMETHAMINE 60 MG: 30 INJECTION, SOLUTION INTRAMUSCULAR; INTRAVENOUS at 14:30

## 2024-11-11 NOTE — PROGRESS NOTES
"Follow Up Visit    Patient: Ronni Salinas  YOB: 1975  Date of Encounter: 11/11/2024  PCP: Kim Best APRN  Referring Provider: No ref. provider found     Subjective   Ronni Salinas is a 49 y.o. male who presents to the office today for evaluation of Follow-up and Pain of the Right Shoulder      Chief Complaint   Patient presents with    Right Shoulder - Follow-up, Pain       HPI  Patient presents for follow-up for chronic right shoulder pain had his MRI needs to go over results.  Reports continued pain and stiffness and no improvement in his symptoms.  PT did not help.  The previous steroid injection did help but only lasted a few weeks  Patient Active Problem List   Diagnosis    HTN (hypertension)    Hypothyroidism (acquired)    Dyslipidemia    Adjustment disorder with anxiety    Hiatal hernia    Acid reflux       Past Medical History:   Diagnosis Date    Acid reflux     Hiatal hernia     High cholesterol     Hypertension     Thyroid condition        Allergies   Allergen Reactions    Codeine Shortness Of Breath    Penicillins Other (See Comments)     Childhood allergy       Vitals:   /95 (BP Location: Right arm, Patient Position: Sitting, Cuff Size: Adult)   Pulse 80   Ht 177.8 cm (70.01\")   Wt 118 kg (259 lb 1.6 oz)   SpO2 97%   BMI 37.16 kg/m²   Estimated body mass index is 37.16 kg/m² as calculated from the following:    Height as of this encounter: 177.8 cm (70.01\").    Weight as of this encounter: 118 kg (259 lb 1.6 oz).        Visit Vitals  /95 (BP Location: Right arm, Patient Position: Sitting, Cuff Size: Adult)   Pulse 80   Ht 177.8 cm (70.01\")   Wt 118 kg (259 lb 1.6 oz)   SpO2 97%   BMI 37.16 kg/m²     49 y.o.male  Physical Exam  Vitals and nursing note reviewed.   Constitutional:       General: He is not in acute distress.     Appearance: Normal appearance. He is obese.   Pulmonary:      Effort: Pulmonary effort is normal. No respiratory distress.   Musculoskeletal:      " Comments: Right shoulder generally restricted range of motion in external rotation flexion abduction with pain as he transitions from below to above the shoulder.  Severe pain on [place] sign.  Mild pain on Corea test.  Painless testing of rotator cuff biceps and triceps.  Neurovascular intact arm   Skin:     General: Skin is warm and dry.      Findings: No erythema.   Neurological:      General: No focal deficit present.      Mental Status: He is alert.      Sensory: No sensory deficit.      Motor: No weakness.         Radiology Results:    MRI Shoulder Right Without Contrast    Result Date: 11/6/2024   1. Bursal sided fraying of the supraspinatus tendon with no evidence of a full-thickness rotator cuff tendon tear. 2. Acromioclavicular joint space arthrosis with a small amount of fluid in the subacromial/subdeltoid bursa.   This report was finalized on 11/6/2024 9:31 AM by Viktor Alan M.D..     - Large Joint Arthrocentesis: R subacromial bursa on 11/11/2024 2:30 PM  Indications: pain and diagnostic evaluation  Details: 22 G needle, posterior approach  Medications: 5 mL lidocaine PF 1% 1 %; 60 mg ketorolac 60 MG/2ML  Outcome: tolerated well, no immediate complications    Injection site in the posterior shoulder was cleaned with alcohol and iodine.  Anesthesia with ethyl chloride.  Then using sterile technique the subacromial space was accessed with a 22-gauge 1.5 inch needle injected with 5 cc 1% lidocaine without epinephrine and 60 mg of Toradol.  Injection site was covered with sterile bandage.  There were no immediate adverse effects and negligible blood loss..  Follow-up care and precautions were discussed.      Procedure, treatment alternatives, risks and benefits explained, specific risks discussed. Consent was given by the patient. Immediately prior to procedure a time out was called to verify the correct patient, procedure, equipment, support staff and site/side marked as required. Patient was  prepped and draped in the usual sterile fashion.            Assessment & Plan   Diagnoses and all orders for this visit:    1. Traumatic tear of right rotator cuff, unspecified tear extent, initial encounter (Primary)  -     meloxicam (MOBIC) 7.5 MG tablet; Take 1 tablet by mouth Daily.  Dispense: 30 tablet; Refill: 1  -     FL Guide For Pain Meds Inj; Future    2. Chronic shoulder bursitis, right  -     meloxicam (MOBIC) 7.5 MG tablet; Take 1 tablet by mouth Daily.  Dispense: 30 tablet; Refill: 1  -     FL Guide For Pain Meds Inj; Future    3. Shoulder joint stiffness, right  -     meloxicam (MOBIC) 7.5 MG tablet; Take 1 tablet by mouth Daily.  Dispense: 30 tablet; Refill: 1  -     FL Guide For Pain Meds Inj; Future    Other orders  -     - Large Joint Arthrocentesis         MEDS ORDERED DURING VISIT:  No orders of the defined types were placed in this encounter.    MEDICATION ISSUES:  Discussed medication options and treatment plan of prescribed medication as well as the risks, benefits, and side effects including potential falls, possible impaired driving and metabolic adversities among others. Patient is agreeable to call the office with any worsening of symptoms or onset of side effects. Patient is agreeable to call 911 or go to the nearest ER should he/she begin having SI/HI.     Discussion:  Reviewed MRI with patient showing partial tears and arthritis.  He elected to go with further conservative management including changing his NSAID to Mobic.  He received a subacromial injection with Toradol in the shoulder today which he noted improved pain after.  He will F/u for US guided AC joint inj. also fluoroscopy guided injection of the glenohumeral joint has been ordered.  If patient continues to have significant pain after these injections we will need to consider referring him him to a surgeon.         This document has been electronically signed by Valdez Saab DO   November 11, 2024 13:45 EST    Part of  this note may be an electronic transcription/translation of spoken language to printed text using the Dragon Dictation System.

## 2024-11-13 ENCOUNTER — TELEPHONE (OUTPATIENT)
Dept: ORTHOPEDIC SURGERY | Facility: CLINIC | Age: 49
End: 2024-11-13
Payer: COMMERCIAL

## 2024-11-16 RX ORDER — LIDOCAINE HYDROCHLORIDE 10 MG/ML
5 INJECTION, SOLUTION EPIDURAL; INFILTRATION; INTRACAUDAL; PERINEURAL
Status: COMPLETED | OUTPATIENT
Start: 2024-11-11 | End: 2024-11-11

## 2024-11-16 RX ORDER — KETOROLAC TROMETHAMINE 30 MG/ML
60 INJECTION, SOLUTION INTRAMUSCULAR; INTRAVENOUS
Status: COMPLETED | OUTPATIENT
Start: 2024-11-11 | End: 2024-11-11

## 2024-11-22 ENCOUNTER — PROCEDURE VISIT (OUTPATIENT)
Dept: ORTHOPEDIC SURGERY | Facility: CLINIC | Age: 49
End: 2024-11-22
Payer: COMMERCIAL

## 2024-11-22 VITALS
DIASTOLIC BLOOD PRESSURE: 92 MMHG | OXYGEN SATURATION: 95 % | SYSTOLIC BLOOD PRESSURE: 132 MMHG | WEIGHT: 259.1 LBS | HEART RATE: 70 BPM | BODY MASS INDEX: 37.09 KG/M2 | HEIGHT: 70 IN

## 2024-11-22 DIAGNOSIS — M75.41 IMPINGEMENT SYNDROME OF RIGHT SHOULDER: ICD-10-CM

## 2024-11-22 DIAGNOSIS — M19.011 OSTEOARTHRITIS OF RIGHT AC (ACROMIOCLAVICULAR) JOINT: ICD-10-CM

## 2024-11-22 DIAGNOSIS — M25.511 CHRONIC RIGHT SHOULDER PAIN: ICD-10-CM

## 2024-11-22 DIAGNOSIS — M75.51 CHRONIC SHOULDER BURSITIS, RIGHT: ICD-10-CM

## 2024-11-22 DIAGNOSIS — S46.011A TRAUMATIC TEAR OF RIGHT ROTATOR CUFF, UNSPECIFIED TEAR EXTENT, INITIAL ENCOUNTER: Primary | ICD-10-CM

## 2024-11-22 DIAGNOSIS — G89.29 CHRONIC RIGHT SHOULDER PAIN: ICD-10-CM

## 2024-11-22 DIAGNOSIS — M25.611 SHOULDER JOINT STIFFNESS, RIGHT: ICD-10-CM

## 2024-11-22 RX ORDER — KETOROLAC TROMETHAMINE 30 MG/ML
30 INJECTION, SOLUTION INTRAMUSCULAR; INTRAVENOUS ONCE
Status: DISCONTINUED | OUTPATIENT
Start: 2024-11-22 | End: 2024-11-22

## 2024-11-22 RX ORDER — KETOROLAC TROMETHAMINE 30 MG/ML
30 INJECTION, SOLUTION INTRAMUSCULAR; INTRAVENOUS ONCE
Status: COMPLETED | OUTPATIENT
Start: 2024-11-22 | End: 2024-11-22

## 2024-11-22 RX ADMIN — TRIAMCINOLONE ACETONIDE 20 MG: 40 INJECTION, SUSPENSION INTRA-ARTICULAR; INTRAMUSCULAR at 13:11

## 2024-11-22 RX ADMIN — KETOROLAC TROMETHAMINE 30 MG: 30 INJECTION, SOLUTION INTRAMUSCULAR; INTRAVENOUS at 13:11

## 2024-11-22 RX ADMIN — LIDOCAINE HYDROCHLORIDE 5 ML: 10 INJECTION, SOLUTION EPIDURAL; INFILTRATION; INTRACAUDAL; PERINEURAL at 13:11

## 2024-11-22 NOTE — PROGRESS NOTES
"Follow Up Visit      Patient: Ronni Salinas  YOB: 1975  Date of Encounter: 11/22/2024  PCP: Kim Best APRN  Referring Provider: No ref. provider found     Subjective   Ronni Salinas is a 49 y.o. male who presents to the office today for evaluation of Follow-up and Pain of the Right Shoulder      Chief Complaint   Patient presents with    Right Shoulder - Follow-up, Pain       HPI  Patient presents for right shoulder AC joint injection with ultrasound guidance but notes that his pain has gotten worse.  Getting a lot of pain when moving the shoulder  Patient Active Problem List   Diagnosis    HTN (hypertension)    Hypothyroidism (acquired)    Dyslipidemia    Adjustment disorder with anxiety    Hiatal hernia    Acid reflux       Past Medical History:   Diagnosis Date    Acid reflux     Hiatal hernia     High cholesterol     Hypertension     Thyroid condition        Allergies   Allergen Reactions    Codeine Shortness Of Breath    Penicillins Other (See Comments)     Childhood allergy       Vitals:   /92 (BP Location: Right arm, Patient Position: Sitting, Cuff Size: Adult)   Pulse 70   Ht 177.8 cm (70\")   Wt 118 kg (259 lb 1.6 oz)   SpO2 95%   BMI 37.18 kg/m²               Visit Vitals  /92 (BP Location: Right arm, Patient Position: Sitting, Cuff Size: Adult)   Pulse 70   Ht 177.8 cm (70\")   Wt 118 kg (259 lb 1.6 oz)   SpO2 95%   BMI 37.18 kg/m²     49 y.o.male  Physical Exam  Vitals and nursing note reviewed.   Constitutional:       General: He is not in acute distress.     Appearance: Normal appearance. He is obese.   Pulmonary:      Effort: Pulmonary effort is normal. No respiratory distress.   Musculoskeletal:      Comments: Right shoulder generally restricted range of motion in external rotation flexion abduction with pain as he transitions from below to above the shoulder.  Severe pain on [place] sign.  Mild pain on Corea test.  Painless testing of rotator cuff biceps and triceps. "  Neurovascular intact arm    Tender spasm of cervical paraspinals and bilateral trapezius   Skin:     General: Skin is warm and dry.      Findings: No erythema.   Neurological:      General: No focal deficit present.      Mental Status: He is alert.      Sensory: No sensory deficit.      Motor: No weakness.       - Medium Joint Arthrocentesis: R acromioclavicular on 11/22/2024 1:11 PM  Indications: pain, diagnostic evaluation and joint swelling  Details: 22 G needle, ultrasound-guided superior approach  Medications: 5 mL lidocaine PF 1% 1 %; 20 mg triamcinolone acetonide 40 MG/ML  Outcome: tolerated well, no immediate complications    AC joint was visualized using ultrasound.  The injection site was marked, and cleaned with alcohol and iodine.  Ethyl chloride spray was used for anesthesia, then 3 cc of 1% lidocaine without epinephrine was injected subcutaneously using a 1.5 inch 25-gauge needle for anesthesia.  Then using sterile technique and ultrasound guidance for accuracy, the AC joint was accessed using a 1.5 inch 22-gauge needle a mixture of 20 mg of triamcinolone and 1.5 cc of 1% lidocaine w/o epi was injected.  Injection site was covered with a sterile bandage.  Follow-up care precautions were discussed.  There were no immediate adverse effects and negligible blood loss.      Procedure, treatment alternatives, risks and benefits explained, specific risks discussed. Consent was given by the patient. Immediately prior to procedure a time out was called to verify the correct patient, procedure, equipment, support staff and site/side marked as required. Patient was prepped and draped in the usual sterile fashion.          Radiology Results:    MRI Shoulder Right Without Contrast    Result Date: 11/6/2024   1. Bursal sided fraying of the supraspinatus tendon with no evidence of a full-thickness rotator cuff tendon tear. 2. Acromioclavicular joint space arthrosis with a small amount of fluid in the  subacromial/subdeltoid bursa.   This report was finalized on 11/6/2024 9:31 AM by Viktor Alan M.D..       Assessment & Plan   Diagnoses and all orders for this visit:    1. Traumatic tear of right rotator cuff, unspecified tear extent, initial encounter (Primary)  -     Discontinue: ketorolac (TORADOL) injection 30 mg  -     ketorolac (TORADOL) injection 30 mg    2. Chronic shoulder bursitis, right  -     Discontinue: ketorolac (TORADOL) injection 30 mg  -     ketorolac (TORADOL) injection 30 mg    3. Shoulder joint stiffness, right  -     Discontinue: ketorolac (TORADOL) injection 30 mg  -     ketorolac (TORADOL) injection 30 mg    4. Impingement syndrome of right shoulder  -     Discontinue: ketorolac (TORADOL) injection 30 mg  -     ketorolac (TORADOL) injection 30 mg    5. Osteoarthritis of right AC (acromioclavicular) joint  -     Discontinue: ketorolac (TORADOL) injection 30 mg  -     ketorolac (TORADOL) injection 30 mg    6. Chronic right shoulder pain  -     Discontinue: ketorolac (TORADOL) injection 30 mg  -     ketorolac (TORADOL) injection 30 mg         MEDS ORDERED DURING VISIT:  New Medications Ordered This Visit   Medications    ketorolac (TORADOL) injection 30 mg     MEDICATION ISSUES:  Discussed medication options and treatment plan of prescribed medication as well as the risks, benefits, and side effects including potential falls, possible impaired driving and metabolic adversities among others. Patient is agreeable to call the office with any worsening of symptoms or onset of side effects. Patient is agreeable to call 911 or go to the nearest ER should he/she begin having SI/HI.     Discussion:  Patient received a right ultrasound-guided AC joint injection today with notable improvement in pain.  He has noted increased pain and spasm of his trapezius and cervical paraspinals.  He also received an IM dose of Toradol for pain relief and will delay any further NSAID intake for 8 to 12 hours.   Follow-up in 2 weeks.  He is scheduled for his fluoroscopy guided glenohumeral joint injection in mid December             This document has been electronically signed by Valdez Saab DO   November 22, 2024 13:10 EST    Dictated Utilizing Dragon Dictation: Part of this note may be an electronic transcription/translation of spoken language to printed text using the Dragon Dictation System.

## 2024-11-30 RX ORDER — TRIAMCINOLONE ACETONIDE 40 MG/ML
20 INJECTION, SUSPENSION INTRA-ARTICULAR; INTRAMUSCULAR
Status: COMPLETED | OUTPATIENT
Start: 2024-11-22 | End: 2024-11-22

## 2024-11-30 RX ORDER — LIDOCAINE HYDROCHLORIDE 10 MG/ML
5 INJECTION, SOLUTION EPIDURAL; INFILTRATION; INTRACAUDAL; PERINEURAL
Status: COMPLETED | OUTPATIENT
Start: 2024-11-22 | End: 2024-11-22

## 2024-12-06 ENCOUNTER — OFFICE VISIT (OUTPATIENT)
Dept: ORTHOPEDIC SURGERY | Facility: CLINIC | Age: 49
End: 2024-12-06
Payer: COMMERCIAL

## 2024-12-06 VITALS
OXYGEN SATURATION: 96 % | HEART RATE: 77 BPM | BODY MASS INDEX: 37.09 KG/M2 | DIASTOLIC BLOOD PRESSURE: 83 MMHG | WEIGHT: 259.1 LBS | SYSTOLIC BLOOD PRESSURE: 128 MMHG | HEIGHT: 70 IN

## 2024-12-06 DIAGNOSIS — M19.011 OSTEOARTHRITIS OF RIGHT AC (ACROMIOCLAVICULAR) JOINT: ICD-10-CM

## 2024-12-06 DIAGNOSIS — M75.41 IMPINGEMENT SYNDROME OF RIGHT SHOULDER: ICD-10-CM

## 2024-12-06 DIAGNOSIS — S46.011A TRAUMATIC TEAR OF RIGHT ROTATOR CUFF, UNSPECIFIED TEAR EXTENT, INITIAL ENCOUNTER: ICD-10-CM

## 2024-12-06 DIAGNOSIS — M25.511 CHRONIC RIGHT SHOULDER PAIN: Primary | ICD-10-CM

## 2024-12-06 DIAGNOSIS — M25.611 SHOULDER JOINT STIFFNESS, RIGHT: ICD-10-CM

## 2024-12-06 DIAGNOSIS — G89.29 CHRONIC RIGHT SHOULDER PAIN: Primary | ICD-10-CM

## 2024-12-06 DIAGNOSIS — M75.51 CHRONIC SHOULDER BURSITIS, RIGHT: ICD-10-CM

## 2024-12-06 RX ORDER — MELOXICAM 7.5 MG/1
15 TABLET ORAL DAILY
Start: 2024-12-06

## 2024-12-06 RX ORDER — KETOROLAC TROMETHAMINE 30 MG/ML
60 INJECTION, SOLUTION INTRAMUSCULAR; INTRAVENOUS ONCE
Status: COMPLETED | OUTPATIENT
Start: 2024-12-06 | End: 2024-12-06

## 2024-12-06 RX ADMIN — KETOROLAC TROMETHAMINE 60 MG: 30 INJECTION, SOLUTION INTRAMUSCULAR; INTRAVENOUS at 12:50

## 2024-12-11 ENCOUNTER — OFFICE VISIT (OUTPATIENT)
Age: 49
End: 2024-12-11
Payer: COMMERCIAL

## 2024-12-11 VITALS
SYSTOLIC BLOOD PRESSURE: 122 MMHG | HEIGHT: 70 IN | WEIGHT: 261.1 LBS | BODY MASS INDEX: 37.38 KG/M2 | DIASTOLIC BLOOD PRESSURE: 78 MMHG

## 2024-12-11 DIAGNOSIS — M75.41 IMPINGEMENT SYNDROME OF RIGHT SHOULDER: ICD-10-CM

## 2024-12-11 DIAGNOSIS — S46.011A RUPTURE OF RIGHT SUPRASPINATUS TENDON, INITIAL ENCOUNTER: Primary | ICD-10-CM

## 2024-12-11 DIAGNOSIS — M19.011 ARTHRITIS OF RIGHT ACROMIOCLAVICULAR JOINT: ICD-10-CM

## 2024-12-11 RX ORDER — ERGOCALCIFEROL 1.25 MG/1
1 CAPSULE, LIQUID FILLED ORAL WEEKLY
COMMUNITY
Start: 2024-12-05

## 2024-12-11 RX ORDER — ONDANSETRON 4 MG/1
TABLET, ORALLY DISINTEGRATING ORAL
COMMUNITY
Start: 2024-12-05

## 2024-12-11 NOTE — PROGRESS NOTES
[facility] Note        Subjective     Pain of the Right Shoulder      HPI    Ronni Salinas is a 49 y.o. male.  Right shoulder pain for years.  Worse the past year.  Treated with previous injections both to the AC joint and subacromial.  Injections give about a week of relief.  He has been treated with NSAIDs and a home exercise program.  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  Pain is severe and nearly 10 out of 10 at times.  He is here to discuss surgical fix as other treatment only gives him temporary relief.    Past Medical History:   Diagnosis Date    Acid reflux     Hiatal hernia     High cholesterol     Hypertension     Thyroid condition       Past Surgical History:   Procedure Laterality Date    CARDIAC CATHETERIZATION      CARPAL TUNNEL RELEASE Right     ENDOSCOPY      FOOT SURGERY      HAND SURGERY Right     TONSILLECTOMY        History reviewed. No pertinent family history.  Social History     Socioeconomic History    Marital status:    Tobacco Use    Smoking status: Never    Smokeless tobacco: Current     Types: Snuff   Vaping Use    Vaping status: Never Used   Substance and Sexual Activity    Alcohol use: Yes    Drug use: Never    Sexual activity: Defer      Current Outpatient Medications on File Prior to Visit   Medication Sig Dispense Refill    atorvastatin (LIPITOR) 20 MG tablet Take 1 tablet by mouth Daily.      diazePAM (VALIUM) 10 MG tablet Take 0.5-1 tablets by mouth 2 (Two) Times a Day As Needed.      levothyroxine sodium (TIROSINT) 50 MCG capsule Take 2 capsules by mouth.      lisinopril (PRINIVIL,ZESTRIL) 20 MG tablet Take 1 tablet every day by oral route for 30 days, for BP.      meloxicam (MOBIC) 7.5 MG tablet Take 2 tablets by mouth Daily.      omeprazole (priLOSEC) 40 MG capsule Take by oral route for 30 days.      ondansetron ODT (ZOFRAN-ODT) 4 MG disintegrating tablet DISSOLVE 1 TABLET ON THE TONGUE EVERY 8 HOURS FOR NAUSEA      orphenadrine  "(NORFLEX) 100 MG 12 hr tablet Take 1 tablet by mouth 2 (Two) Times a Day As Needed for Muscle Spasms or Mild Pain. 14 tablet 0    vitamin D (ERGOCALCIFEROL) 1.25 MG (88216 UT) capsule capsule Take 1 capsule by mouth 1 (One) Time Per Week.       No current facility-administered medications on file prior to visit.      Allergies   Allergen Reactions    Codeine Shortness Of Breath    Penicillins Other (See Comments)     Childhood allergy          Review of Systems   Constitutional: Negative.    HENT: Negative.     Eyes: Negative.    Respiratory: Negative.     Cardiovascular: Negative.    Gastrointestinal: Negative.    Endocrine: Negative.    Genitourinary: Negative.    Musculoskeletal:  Positive for arthralgias.   Skin: Negative.    Allergic/Immunologic: Negative.    Neurological: Negative.    Hematological: Negative.    Psychiatric/Behavioral: Negative.          I reviewed the patient's chief complaint, history of present illness, review of systems, past medical history, surgical history, family history, social history, medications and allergy list.        Objective      Physical Exam  /78   Ht 177.8 cm (70\")   Wt 118 kg (261 lb 1.6 oz)   BMI 37.46 kg/m²     Body mass index is 37.46 kg/m².    General  Mental Status - alert  General Appearance - cooperative, well groomed, not in acute distress  Orientation - Oriented X3  Build & Nutrition - well developed and well nourished  Posture - normal posture  Gait - normal gait       Ortho Exam  Right shoulder full motion full-strength with positive impingement.  Tender AC joint.  Positive crossarm adduction test    Imaging/Studies Reviewed and Interpreted:  Imaging Results (Last 24 Hours)       ** No results found for the last 24 hours. **          I viewed and personally interpreted his MRI from November 11 which shows a partial bursal sided cuff tear and AC joint arthritis.    Assessment    Assessment:  1. Rupture of right supraspinatus tendon, initial encounter  "   2. Arthritis of right acromioclavicular joint    3. Impingement syndrome of right shoulder        Plan:  The plan is for right shoulder arthroscopy with rotator cuff repair distal clavicle excision and acromioplasty.  He has a bursal sided cuff tear.  He has symptomatic AC joint arthritis.  He has failed nonoperative treatment to include NSAIDs injections and home exercise programTreatment options and alternatives were discussed with patient and family.  Surgical risks include but are not limited to pain, bleeding, infection, failure to relieve symptoms, need for further procedures, recurrence of symptoms, damage to healthy adjacent structures, hardware loosening/failure, stiffness, weakness, scar, blood clots/DVT/PE, loss of limb or life. We also discussed the postoperative protocol and expected outcome although no guarantees are possible with surgery. All questions were answered; the patient would like to proceed with surgical intervention.  Surgery would be after January 1 because of his cortisone injection in November        Ángel Govea MD  12/11/24  13:26 EST      Dictated Utilizing Dragon Dictation.

## 2024-12-15 NOTE — PROGRESS NOTES
"Follow Up Visit      Patient: Ronni Salinas  YOB: 1975  Date of Encounter: 12/06/2024  PCP: Kim Best APRN  Referring Provider: No ref. provider found     Subjective   Ronni Salinas is a 49 y.o. male who presents to the office today for evaluation of Follow-up and Pain of the Right Shoulder      Chief Complaint   Patient presents with    Right Shoulder - Follow-up, Pain       HPI  Patient presents for follow-up for right shoulder pain.  States that got a lot of relief from his AC joint injection on 11/22/2024 however the shoulder has been flared up again for about 3 days and it is as bad as ever.  Patient Active Problem List   Diagnosis    HTN (hypertension)    Hypothyroidism (acquired)    Dyslipidemia    Adjustment disorder with anxiety    Hiatal hernia    Acid reflux       Past Medical History:   Diagnosis Date    Acid reflux     Hiatal hernia     High cholesterol     Hypertension     Thyroid condition        Allergies   Allergen Reactions    Codeine Shortness Of Breath    Penicillins Other (See Comments)     Childhood allergy       Vitals:   /83 (BP Location: Left arm, Patient Position: Sitting, Cuff Size: Adult)   Pulse 77   Ht 177.8 cm (70\")   Wt 118 kg (259 lb 1.6 oz)   SpO2 96%   BMI 37.18 kg/m²               Visit Vitals  /83 (BP Location: Left arm, Patient Position: Sitting, Cuff Size: Adult)   Pulse 77   Ht 177.8 cm (70\")   Wt 118 kg (259 lb 1.6 oz)   SpO2 96%   BMI 37.18 kg/m²     49 y.o.male  Physical Exam  Vitals and nursing note reviewed.   Constitutional:       General: He is not in acute distress.     Appearance: Normal appearance. He is obese.   Pulmonary:      Effort: Pulmonary effort is normal. No respiratory distress.   Musculoskeletal:      Comments: Right shoulder generally restricted range of motion in external rotation flexion abduction with pain as he transitions from below to above the shoulder.  Severe pain on [place] sign.  Mild pain on [place] test.  " Painless testing of rotator cuff biceps and triceps.  Neurovascular intact arm.  Painful AC crossover and shear    Tender spasm of cervical paraspinals and bilateral trapezius   Skin:     General: Skin is warm and dry.      Findings: No erythema.   Neurological:      General: No focal deficit present.      Mental Status: He is alert.      Sensory: No sensory deficit.      Motor: No weakness.         Radiology Results:    No radiology results for the last 30 days.    Assessment & Plan   Diagnoses and all orders for this visit:    1. Chronic right shoulder pain (Primary)  -     meloxicam (MOBIC) 7.5 MG tablet; Take 2 tablets by mouth Daily.  -     Ambulatory Referral to Orthopedic Surgery  -     ketorolac (TORADOL) injection 60 mg    2. Traumatic tear of right rotator cuff, unspecified tear extent, initial encounter  -     meloxicam (MOBIC) 7.5 MG tablet; Take 2 tablets by mouth Daily.  -     Ambulatory Referral to Orthopedic Surgery  -     ketorolac (TORADOL) injection 60 mg    3. Chronic shoulder bursitis, right  -     meloxicam (MOBIC) 7.5 MG tablet; Take 2 tablets by mouth Daily.  -     Ambulatory Referral to Orthopedic Surgery  -     ketorolac (TORADOL) injection 60 mg    4. Shoulder joint stiffness, right  -     meloxicam (MOBIC) 7.5 MG tablet; Take 2 tablets by mouth Daily.  -     Ambulatory Referral to Orthopedic Surgery  -     ketorolac (TORADOL) injection 60 mg    5. Impingement syndrome of right shoulder  -     meloxicam (MOBIC) 7.5 MG tablet; Take 2 tablets by mouth Daily.  -     Ambulatory Referral to Orthopedic Surgery  -     ketorolac (TORADOL) injection 60 mg    6. Osteoarthritis of right AC (acromioclavicular) joint  -     meloxicam (MOBIC) 7.5 MG tablet; Take 2 tablets by mouth Daily.  -     Ambulatory Referral to Orthopedic Surgery  -     ketorolac (TORADOL) injection 60 mg         MEDS ORDERED DURING VISIT:  New Medications Ordered This Visit   Medications    meloxicam (MOBIC) 7.5 MG tablet      Sig: Take 2 tablets by mouth Daily.    ketorolac (TORADOL) injection 60 mg     MEDICATION ISSUES:  Discussed medication options and treatment plan of prescribed medication as well as the risks, benefits, and side effects including potential falls, possible impaired driving and metabolic adversities among others. Patient is agreeable to call the office with any worsening of symptoms or onset of side effects. Patient is agreeable to call 911 or go to the nearest ER should he/she begin having SI/HI.     Discussion:  Patient did have a good response to the AC joint injection and then indicating that a good portion of his pain is coming from there however it did not last nearly long enough.  Patient is getting scheduled for fluoroscopy guided glenohumeral joint injection however I am unconvinced at this point that that is going to be beneficial and recommend he cancel it and consult with the surgeon for possible intervention for his AC arthritis and supraspinatus partial tear.  More steroid injections would only delay his ability to get surgery.  Follow-up as needed especially if further conservative management is recommended.  Patient did receive an IM dose of Toradol for pain relief today in the office and we will increase his Mobic to 15 mg by taking 2 of the 7.5 mg pills he already has.  If this is beneficial we will send him in a refill and if not we will change it to a different NSAID.  Patient will let us know if it is effective             This document has been electronically signed by Valdez Saab DO   December 15, 2024 18:55 EST    Dictated Utilizing Dragon Dictation: Part of this note may be an electronic transcription/translation of spoken language to printed text using the Dragon Dictation System.

## 2025-02-11 ENCOUNTER — TELEPHONE (OUTPATIENT)
Dept: ORTHOPEDIC SURGERY | Facility: CLINIC | Age: 50
End: 2025-02-11
Payer: COMMERCIAL

## 2025-02-11 NOTE — TELEPHONE ENCOUNTER
Caller: CHIDI    Relationship: SELF    Best call back number: [phone]    What is the best time to reach you: ANY    Who are you requesting to speak with (clinical staff, provider,  specific staff member): CLINICAL    What was the call regarding: HE HAS QUESTIONS REGARDING SX INSTRUCTIONS- MEDICATIONS TO AVOID- PLEASE CALL

## 2025-02-18 ENCOUNTER — OUTSIDE FACILITY SERVICE (OUTPATIENT)
Dept: ORTHOPEDIC SURGERY | Facility: CLINIC | Age: 50
End: 2025-02-18
Payer: COMMERCIAL

## 2025-02-18 DIAGNOSIS — Z98.890 S/P RIGHT ROTATOR CUFF REPAIR: Primary | ICD-10-CM

## 2025-02-18 RX ORDER — ONDANSETRON 4 MG/1
4 TABLET, FILM COATED ORAL EVERY 8 HOURS PRN
Qty: 10 TABLET | Refills: 1 | Status: SHIPPED | OUTPATIENT
Start: 2025-02-18

## 2025-02-18 RX ORDER — OXYCODONE AND ACETAMINOPHEN 5; 325 MG/1; MG/1
1 TABLET ORAL EVERY 6 HOURS PRN
Qty: 20 TABLET | Refills: 0 | Status: SHIPPED | OUTPATIENT
Start: 2025-02-18

## 2025-02-26 ENCOUNTER — OFFICE VISIT (OUTPATIENT)
Age: 50
End: 2025-02-26
Payer: COMMERCIAL

## 2025-02-26 VITALS — TEMPERATURE: 98.1 F

## 2025-02-26 DIAGNOSIS — M19.011 ARTHRITIS OF RIGHT ACROMIOCLAVICULAR JOINT: ICD-10-CM

## 2025-02-26 DIAGNOSIS — Z98.890 S/P RIGHT ROTATOR CUFF REPAIR: Primary | ICD-10-CM

## 2025-02-26 DIAGNOSIS — S46.011A RUPTURE OF RIGHT SUPRASPINATUS TENDON, INITIAL ENCOUNTER: ICD-10-CM

## 2025-02-26 NOTE — PROGRESS NOTES
Chief Complaint   Patient presents with    Post-op     1 week s/p right rotator cuff repair 2/18/25            HPI  He is follow-up right shoulder rotator cuff repair and distal clavicle excision      Vitals:    02/26/25 1417   Temp: 98.1 °F (36.7 °C)         Physical Exam:  Incisions look good.  He is neurologic intact.      X-RAY REPORT:  Imaging Results (Last 7 Days)       ** No results found for the last 168 hours. **                  ICD-10-CM ICD-9-CM   1. S/P right rotator cuff repair  Z98.890 V45.89   2. Rupture of right supraspinatus tendon, initial encounter  S46.011A 840.6   3. Arthritis of right acromioclavicular joint  M19.011 716.91     He will follow-up in 2 weeks to start physical therapy.  In the meantime he will continue the sling.      Ángel Govea M.D., [facility]  Orthopedic Surgeon  Fellowship Trained Sports Medicine  [facility]  Orthopedics and Sports Medicine  [address] [place] [address], [address]  [place] [address]      EMR Dragon/Transcription disclaimer:  Please note that portions of this document were completed with a voice recognition program.      At [facility], we believe that sharing information builds trust and better relationships. You are receiving this note because you are receiving care at [facility] or have recently visited. It is possible you will see health information before a provider has talked with you about it. This kind of information can be easy to misunderstand as it is a medical document. It is intended as ltub-ml-[id number] communication. It is written in medical language and may contain abbreviations or verbiage that are unfamiliar. It may appear blunt or direct. Medical documents are intended to carry relevant information, facts as evident, and the clinical opinion of the practitioner.  To help you fully understand what it means for your health, we urge you to discuss this note with your provider.

## 2025-03-06 ENCOUNTER — TELEPHONE (OUTPATIENT)
Dept: ORTHOPEDIC SURGERY | Facility: CLINIC | Age: 50
End: 2025-03-06
Payer: COMMERCIAL

## 2025-03-06 NOTE — TELEPHONE ENCOUNTER
"STEVAN    Received a call from the patient stating he had surgery on his \"collarbone / shoulder\" on 2/18/25 with provider PETER, and has since been experiencing excruciating pain. The patient states the first 2 days after the surgery, there was hardly any pain. However, he mentioned that after his follow up with the provider (2/26/25) the pain has gotten increasingly worse.The patient mentions that he is hardly able to sleep, waking up every 45 minutes or so, unable to get comfortable. On a pain scale of 1-10, he indicates his pan is anywhere between 4-10, depending on if he is asleep or awake. Patient denies any additional symptoms with the exception of the pain (no fever, vomiting, nausea, bleeding, discharge, discoloration nor warmth to the touch). Patient is concerned as if somehow he may have \"tweaked\" it, or if this is considered normal. Patient is requesting a call back in regard to his concerns. Please advise.     Thank you kindly.   "

## 2025-03-07 ENCOUNTER — OFFICE VISIT (OUTPATIENT)
Dept: ORTHOPEDIC SURGERY | Facility: CLINIC | Age: 50
End: 2025-03-07
Payer: COMMERCIAL

## 2025-03-07 DIAGNOSIS — M19.011 ARTHRITIS OF RIGHT ACROMIOCLAVICULAR JOINT: ICD-10-CM

## 2025-03-07 DIAGNOSIS — Z98.890 S/P RIGHT ROTATOR CUFF REPAIR: Primary | ICD-10-CM

## 2025-03-07 DIAGNOSIS — S46.011A RUPTURE OF RIGHT SUPRASPINATUS TENDON, INITIAL ENCOUNTER: ICD-10-CM

## 2025-03-07 PROCEDURE — 1160F RVW MEDS BY RX/DR IN RCRD: CPT | Performed by: ORTHOPAEDIC SURGERY

## 2025-03-07 PROCEDURE — 99024 POSTOP FOLLOW-UP VISIT: CPT | Performed by: ORTHOPAEDIC SURGERY

## 2025-03-07 PROCEDURE — 1159F MED LIST DOCD IN RCRD: CPT | Performed by: ORTHOPAEDIC SURGERY

## 2025-03-07 NOTE — TELEPHONE ENCOUNTER
Hub please transfer patient to clinic to speak with Jaye regarding message below.    Patient is going to call someone to try to bring him today by 11:00. He will call the office back and let me know if he can come today. If not I will make him an appointment on Monday.  Jaye Gray RT (R), ROT

## 2025-03-07 NOTE — PROGRESS NOTES
[facility] [place]        Subjective     Post-op (2 week s/p right rotator cuff repair (DOS - 2/18/25) )       RL Salinas is a 49 y.o. male.  He is here about some night pain.  So I had him come in for recheck.  No fevers no drainage pain is only bad at night but getting better          Objective      Physical Exam  There were no vitals taken for this visit.    There is no height or weight on file to calculate BMI.        Ortho Exam  Incisions look good.  Distally neurologically intact.    Imaging Reviewed and Interpreted:  Imaging Results (Last 24 Hours)       ** No results found for the last 24 hours. **              Assessment    Assessment:  1. S/P right rotator cuff repair    2. Rupture of right supraspinatus tendon, initial encounter    3. Arthritis of right acromioclavicular joint        Plan:  He will continue the sling.  He did not feel he needed narcotic pain medicine.  He just wanted reassurance that night pain is to be expected.  He will follow-up in 2 weeks to start physical therapy.      Ángel Govea MD  03/07/25  11:39 EST      Dictated Utilizing Dragon Dictation.

## 2025-03-12 ENCOUNTER — OFFICE VISIT (OUTPATIENT)
Age: 50
End: 2025-03-12
Payer: COMMERCIAL

## 2025-03-12 DIAGNOSIS — Z98.890 S/P RIGHT ROTATOR CUFF REPAIR: Primary | ICD-10-CM

## 2025-03-12 NOTE — PROGRESS NOTES
[facility] Note        Subjective     CC: Post-op (5 day recheck -Status post right rotator cuff repair (DOS - 2/18/25))      RL Salinas is a 49 y.o. male.  He is doing better with no new complaints        ROS:    Constiutional:Pt denies fever, chills, nausea, or vomiting.  MSK:as above        Objective      Past Medical History  Past Medical History:   Diagnosis Date    Acid reflux     Hiatal hernia     High cholesterol     Hypertension     Thyroid condition      Social History     Socioeconomic History    Marital status:    Tobacco Use    Smoking status: Never    Smokeless tobacco: Current     Types: Snuff   Vaping Use    Vaping status: Never Used   Substance and Sexual Activity    Alcohol use: Yes    Drug use: Never    Sexual activity: Defer          Physical Exam  There were no vitals taken for this visit.    There is no height or weight on file to calculate BMI.    Patient is well nourished and well developed.        Ortho Exam  Right shoulder portals are good Steri-Strips.  He is neurologically intact    Imaging/Labs/EMG Reviewed and Interpreted:  Imaging Results (Last 24 Hours)       ** No results found for the last 24 hours. **              Assessment    Assessment:  1. S/P right rotator cuff repair        Plan:  Recommend over the counter anti-inflammatories for pain and/or swelling  He will do physical therapy close to home in [place].  Follow-up in a month.  Restrictions are no lifting or carrying with the right arm      Ángel Govea MD  03/12/25  14:24 EDT      Dictated Utilizing Dragon Dictation.

## 2025-03-19 ENCOUNTER — TELEPHONE (OUTPATIENT)
Dept: ORTHOPEDIC SURGERY | Facility: CLINIC | Age: 50
End: 2025-03-19
Payer: COMMERCIAL

## 2025-03-19 NOTE — TELEPHONE ENCOUNTER
Spoke with Mr. Salinas and advised him that it is fine to go ahead with the procedure. The rotator cuff repair shouldn't interfere with the scheduled MRI. Its safe to go ahead with the procedure.The patient verbalized good understanding.     Detail Level: Detailed

## 2025-03-19 NOTE — TELEPHONE ENCOUNTER
Patient states he is scheduled for a Lumbar Spine MRI this Friday. He thought Dr. Govea had mentioned anchors as part of his recent surgery and just wanted to make sure he is able to have the MRI. He's asking for a call back at [phone].

## 2025-04-09 ENCOUNTER — OFFICE VISIT (OUTPATIENT)
Age: 50
End: 2025-04-09
Payer: COMMERCIAL

## 2025-04-09 VITALS — BODY MASS INDEX: 37.37 KG/M2 | WEIGHT: 261 LBS | HEIGHT: 70 IN

## 2025-04-09 DIAGNOSIS — Z98.890 S/P RIGHT ROTATOR CUFF REPAIR: Primary | ICD-10-CM

## 2025-04-09 NOTE — PROGRESS NOTES
Chief Complaint   Patient presents with    Post-op     7 week follow up- 5 week recheck -Status post right rotator cuff repair (DOS - 2/18/25)           HPI    He is 2 months out from his right shoulder cuff repair.  He continues to have some pain.  [facility] to [facility] in [place].  There were no vitals filed for this visit.      Physical Exam:    Right shoulder with 90 degrees of flexion abduction.            ICD-10-CM ICD-9-CM   1. S/P right rotator cuff repair  Z98.890 V45.89       Orders Placed This Encounter   Procedures    Ambulatory Referral to Physical Therapy for Evaluation & Treatment     He will continue physical therapy [facility].  Follow-up in 1 month.  He needs to work on range of motion.  He is behind with motion.        Ángel Govea M.D., [facility]  Orthopedic Surgeon  Fellowship Trained Sports Medicine  [facility]  Orthopedics and Sports Medicine  [address] [place] [address], [address]  [place] [address]      EMR Dragon/Transcription disclaimer:  Please note that portions of this document were completed with a voice recognition program.      At [facility], we believe that sharing information builds trust and better relationships. You are receiving this note because you are receiving care at [facility] or have recently visited. It is possible you will see health information before a provider has talked with you about it. This kind of information can be easy to misunderstand as it is a medical document. It is intended as eflk-zu-foxm communication. It is written in medical language and may contain abbreviations or verbiage that are unfamiliar. It may appear blunt or direct. Medical documents are intended to carry relevant information, facts as evident, and the clinical opinion of the practitioner.  To help you fully understand what it means for your health, we urge you to discuss this note with your provider.

## 2025-04-09 NOTE — PROGRESS NOTES
Chief Complaint   Patient presents with    Post-op     4 week follow up- 5 week recheck -Status post right rotator cuff repair (DOS - 2/18/25)           HPI        There were no vitals filed for this visit.      Physical Exam:        X-RAY REPORT:  Imaging Results (Last 7 Days)       ** No results found for the last 168 hours. **                No diagnosis found.    No orders of the defined types were placed in this encounter.            Ángel Govea M.D., [facility]  Orthopedic Surgeon  Fellowship Trained Sports Medicine  [facility]  Orthopedics and Sports Medicine  [address] [place] [address], [address]  [place] [address]      EMR Dragon/Transcription disclaimer:  Please note that portions of this document were completed with a voice recognition program.      At [facility], we believe that sharing information builds trust and better relationships. You are receiving this note because you are receiving care at [facility] or have recently visited. It is possible you will see health information before a provider has talked with you about it. This kind of information can be easy to misunderstand as it is a medical document. It is intended as grme-sh-hgdm communication. It is written in medical language and may contain abbreviations or verbiage that are unfamiliar. It may appear blunt or direct. Medical documents are intended to carry relevant information, facts as evident, and the clinical opinion of the practitioner.  To help you fully understand what it means for your health, we urge you to discuss this note with your provider.

## 2025-05-07 ENCOUNTER — OFFICE VISIT (OUTPATIENT)
Age: 50
End: 2025-05-07
Payer: COMMERCIAL

## 2025-05-07 DIAGNOSIS — Z98.890 S/P RIGHT ROTATOR CUFF REPAIR: Primary | ICD-10-CM

## 2025-05-07 DIAGNOSIS — M75.01 ADHESIVE CAPSULITIS OF RIGHT SHOULDER: ICD-10-CM

## 2025-05-07 NOTE — PROGRESS NOTES
Chief Complaint   Patient presents with    Post-op     4 week follow up-11 weeks Status post right rotator cuff repair (DOS - 2/18/25)           HPI  He is 3 months out from his right shoulder cuff repair.  He is stiff.  He is not happy with his physical therapist.  He has only had 4 sessions of hands-on physical therapy      There were no vitals filed for this visit.      Physical Exam:  Right shoulder is stiff.  He rarely gets above 100 degrees of flexion abduction.  He has limited internal and external rotation.          ICD-10-CM ICD-9-CM   1. S/P right rotator cuff repair  Z98.890 V45.89   2. Adhesive capsulitis of right shoulder  M75.01 726.0       Orders Placed This Encounter   Procedures    Ambulatory Referral to Physical Therapy for Evaluation & Treatment     I recommend he go to a different physical therapist.  Follow-up in a month.  If he does not get improvement we will need cortisone injection possible lysis of adhesions.  He is at least a month behind.  Sounds like he has had poor physical therapy        Ángel Govea M.D., FAAOS  Orthopedic Surgeon  Fellowship Trained Sports Medicine  [facility]  Orthopedics and Sports Medicine  [address] [place] [address], [address]  [place] [address]      EMR Dragon/Transcription disclaimer:  Please note that portions of this document were completed with a voice recognition program.      At [facility], we believe that sharing information builds trust and better relationships. You are receiving this note because you are receiving care at [facility] or have recently visited. It is possible you will see health information before a provider has talked with you about it. This kind of information can be easy to misunderstand as it is a medical document. It is intended as zvlq-st-vhbh communication. It is written in medical language and may contain abbreviations or verbiage that are unfamiliar. It may appear blunt or direct. Medical documents are  intended to carry relevant information, facts as evident, and the clinical opinion of the practitioner.  To help you fully understand what it means for your health, we urge you to discuss this note with your provider.

## 2025-06-11 ENCOUNTER — OFFICE VISIT (OUTPATIENT)
Age: 50
End: 2025-06-11
Payer: COMMERCIAL

## 2025-06-11 VITALS
WEIGHT: 266.9 LBS | HEIGHT: 70 IN | SYSTOLIC BLOOD PRESSURE: 122 MMHG | BODY MASS INDEX: 38.21 KG/M2 | DIASTOLIC BLOOD PRESSURE: 80 MMHG

## 2025-06-11 DIAGNOSIS — M75.01 ADHESIVE CAPSULITIS OF RIGHT SHOULDER: ICD-10-CM

## 2025-06-11 DIAGNOSIS — Z98.890 S/P RIGHT ROTATOR CUFF REPAIR: Primary | ICD-10-CM

## 2025-06-11 NOTE — PROGRESS NOTES
"    [facility] Orth[facility] Surgery Clinic Note        Subjective     CC: Follow-up (1 zeenat follow up -- 4 month Status post right rotator cuff repair (DOS - 2/18/25))      RL Salinas is a 49 y.o. male.  He changed therapist and is doing much better.  [facility].    Overall, patient's symptoms are improving    ROS:    Constiutional:Pt denies fever, chills, nausea, or vomiting.  MSK:as above        Objective      Past Medical History  Past Medical History:   Diagnosis Date   • Acid reflux    • Hiatal hernia    • High cholesterol    • Hypertension    • Thyroid condition      Social History     Socioeconomic History   • Marital status:    Tobacco Use   • Smoking status: Never   • Smokeless tobacco: Current     Types: Snuff   Vaping Use   • Vaping status: Never Used   Substance and Sexual Activity   • Alcohol use: Yes   • Drug use: Never   • Sexual activity: Defer          Physical Exam  /80   Ht 177.8 cm (70\")   Wt 121 kg (266 lb 14.4 oz)   BMI 38.30 kg/m²     Body mass index is 38.3 kg/m².    Patient is well nourished and well developed.        Ortho Exam  Right shoulder with better range of motion.  He gets about 140 degrees of forward flexion abduction.  Strength improving.    Imaging/Labs/EMG Reviewed and Interpreted:  Imaging Results (Last 24 Hours)       ** No results found for the last 24 hours. **              Assessment    Assessment:  1. S/P right rotator cuff repair    2. Adhesive capsulitis of right shoulder        Plan:  Recommend over the counter anti-inflammatories for pain and/or swelling  He will continue physical therapy.  No need for a shot today.  He will follow-up in 1 month.  He works for himself.      Ángel Govea MD  06/11/25  14:11 EDT      Dictated Utilizing Dragon Dictation.  Answers submitted by the patient for this visit:  Post Operative Visit (Submitted on 6/9/2025)  Chief Complaint: Follow-up  Pain Control: well controlled  Fever: no fever  Diet: adequate " intake  Activity: moderately limited  Operative Site Issues: No

## 2025-07-09 ENCOUNTER — OFFICE VISIT (OUTPATIENT)
Age: 50
End: 2025-07-09
Payer: COMMERCIAL

## 2025-07-09 VITALS
WEIGHT: 261.3 LBS | DIASTOLIC BLOOD PRESSURE: 102 MMHG | HEIGHT: 70 IN | BODY MASS INDEX: 37.41 KG/M2 | SYSTOLIC BLOOD PRESSURE: 150 MMHG

## 2025-07-09 DIAGNOSIS — M75.01 ADHESIVE CAPSULITIS OF RIGHT SHOULDER: ICD-10-CM

## 2025-07-09 DIAGNOSIS — Z98.890 S/P RIGHT ROTATOR CUFF REPAIR: Primary | ICD-10-CM

## 2025-07-09 RX ORDER — TIRZEPATIDE 2.5 MG/.5ML
INJECTION, SOLUTION SUBCUTANEOUS
COMMUNITY
Start: 2025-06-30

## 2025-07-09 RX ORDER — SILDENAFIL 100 MG/1
TABLET, FILM COATED ORAL
COMMUNITY
Start: 2025-02-10

## 2025-07-09 RX ORDER — GUANFACINE 1 MG/1
1 TABLET, EXTENDED RELEASE ORAL DAILY
COMMUNITY

## 2025-07-09 RX ORDER — ONDANSETRON 4 MG/1
4 TABLET, FILM COATED ORAL EVERY 8 HOURS PRN
COMMUNITY

## 2025-07-09 RX ORDER — SEMAGLUTIDE 0.25 MG/.5ML
INJECTION, SOLUTION SUBCUTANEOUS
COMMUNITY
Start: 2025-07-01

## 2025-07-09 RX ORDER — BUPIVACAINE HYDROCHLORIDE 2.5 MG/ML
4 INJECTION, SOLUTION EPIDURAL; INFILTRATION; INTRACAUDAL; PERINEURAL
Status: COMPLETED | OUTPATIENT
Start: 2025-07-09 | End: 2025-07-09

## 2025-07-09 RX ORDER — OXYCODONE AND ACETAMINOPHEN 5; 325 MG/1; MG/1
TABLET ORAL
COMMUNITY

## 2025-07-09 RX ORDER — ONDANSETRON 4 MG/1
TABLET, ORALLY DISINTEGRATING ORAL
COMMUNITY

## 2025-07-09 RX ORDER — TRIAMCINOLONE ACETONIDE 40 MG/ML
40 INJECTION, SUSPENSION INTRA-ARTICULAR; INTRAMUSCULAR
Status: COMPLETED | OUTPATIENT
Start: 2025-07-09 | End: 2025-07-09

## 2025-07-09 RX ORDER — DIAZEPAM 10 MG/1
5-10 TABLET ORAL 2 TIMES DAILY PRN
COMMUNITY
Start: 2025-06-30

## 2025-07-09 RX ORDER — LIDOCAINE HYDROCHLORIDE 10 MG/ML
4 INJECTION, SOLUTION EPIDURAL; INFILTRATION; INTRACAUDAL; PERINEURAL
Status: COMPLETED | OUTPATIENT
Start: 2025-07-09 | End: 2025-07-09

## 2025-07-09 RX ADMIN — BUPIVACAINE HYDROCHLORIDE 4 ML: 2.5 INJECTION, SOLUTION EPIDURAL; INFILTRATION; INTRACAUDAL; PERINEURAL at 15:06

## 2025-07-09 RX ADMIN — TRIAMCINOLONE ACETONIDE 40 MG: 40 INJECTION, SUSPENSION INTRA-ARTICULAR; INTRAMUSCULAR at 15:06

## 2025-07-09 RX ADMIN — LIDOCAINE HYDROCHLORIDE 4 ML: 10 INJECTION, SOLUTION EPIDURAL; INFILTRATION; INTRACAUDAL; PERINEURAL at 15:06

## 2025-07-09 NOTE — PROGRESS NOTES
Procedure   - Large Joint Arthrocentesis: R glenohumeral on 7/9/2025 3:06 PM  Indications: pain  Details: 21 G needle, posterior approach  Medications: 4 mL lidocaine PF 1% 1 %; 40 mg triamcinolone acetonide 40 MG/ML; 4 mL bupivacaine (PF) 0.25 %  Outcome: tolerated well, no immediate complications  Procedure, treatment alternatives, risks and benefits explained, specific risks discussed. Consent was given by the patient. Immediately prior to procedure a time out was called to verify the correct patient, procedure, equipment, support staff and site/side marked as required. Patient was prepped and draped in the usual sterile fashion.

## 2025-07-09 NOTE — PROGRESS NOTES
"    [facility] [facility] [place]        Subjective     CC: Follow-up (4 week follow up right rotator cuff repair )      RL Salinas is a 49 y.o. male.  He is 5 months out from surgery.  He has plateaued at PT.    Overall, patient's symptoms are plateauing.    ROS:    Constiutional:Pt denies fever, chills, nausea, or vomiting.  MSK:as above        Objective      Past Medical History  Past Medical History:   Diagnosis Date    Acid reflux     Hiatal hernia     High cholesterol     Hypertension     Thyroid condition      Social History     Socioeconomic History    Marital status:    Tobacco Use    Smoking status: Never    Smokeless tobacco: Current     Types: Snuff   Vaping Use    Vaping status: Never Used   Substance and Sexual Activity    Alcohol use: Yes    Drug use: Never    Sexual activity: Defer          Physical Exam  BP (!) 150/102   Ht 177.8 cm (70\")   Wt 119 kg (261 lb 4.8 oz)   BMI 37.49 kg/m²     Body mass index is 37.49 kg/m².    Patient is well nourished and well developed.        Ortho Exam  He gets at least out of 40 degrees of forward flexion abduction.  Maybe 160.    Imaging/Labs/EMG Reviewed and Interpreted:  Imaging Results (Last 24 Hours)       ** No results found for the last 24 hours. **              Assessment    Assessment:  1. S/P right rotator cuff repair    2. Adhesive capsulitis of right shoulder        Plan:  Recommend over the counter anti-inflammatories for pain and/or swelling  I injected his right shoulder glenohumeral joint with cortisone.  I discussed with the patient the potential benefits of performing a therapeutic injection of the cortisone as well as potential risks including but not limited to infection, swelling, pain, bleeding, bruising, nerve/vessel damage, skin color changes, transient elevation in blood glucose levels, and fat atrophy. After informed consent and verifying correct patient, procedure site, and type of procedure, the area was prepped " with Hibiclens, ethyl chloride was used to numb the skin. The patient tolerated the procedure well. There were no complications.  He will continue physical therapy and follow-up in 1 month      Ángel Govea MD  07/09/25  14:59 EDT      Dictated Utilizing Dragon Dictation.

## 2025-07-22 ENCOUNTER — TRANSCRIBE ORDERS (OUTPATIENT)
Dept: ADMINISTRATIVE | Facility: HOSPITAL | Age: 50
End: 2025-07-22
Payer: COMMERCIAL

## 2025-07-22 ENCOUNTER — LAB (OUTPATIENT)
Dept: LAB | Facility: HOSPITAL | Age: 50
End: 2025-07-22
Payer: COMMERCIAL

## 2025-07-22 DIAGNOSIS — E29.1 TESTICULAR HYPOFUNCTION: Primary | ICD-10-CM

## 2025-07-22 DIAGNOSIS — E29.1 TESTICULAR HYPOFUNCTION: ICD-10-CM

## 2025-07-22 PROCEDURE — 36415 COLL VENOUS BLD VENIPUNCTURE: CPT

## 2025-07-22 PROCEDURE — 84402 ASSAY OF FREE TESTOSTERONE: CPT

## 2025-07-22 PROCEDURE — 84403 ASSAY OF TOTAL TESTOSTERONE: CPT

## 2025-07-26 LAB
TESTOST FREE SERPL-MCNC: 2.5 PG/ML (ref 7.2–24)
TESTOST SERPL-MCNC: 138 NG/DL (ref 264–916)

## 2025-08-13 ENCOUNTER — TELEPHONE (OUTPATIENT)
Age: 50
End: 2025-08-13

## 2025-08-26 ENCOUNTER — LAB (OUTPATIENT)
Dept: LAB | Facility: HOSPITAL | Age: 50
End: 2025-08-26
Payer: COMMERCIAL

## 2025-08-26 ENCOUNTER — TRANSCRIBE ORDERS (OUTPATIENT)
Dept: ADMINISTRATIVE | Facility: HOSPITAL | Age: 50
End: 2025-08-26
Payer: COMMERCIAL

## 2025-08-26 DIAGNOSIS — R53.83 TIREDNESS: ICD-10-CM

## 2025-08-26 DIAGNOSIS — E29.1 TESTICULAR FAILURE: ICD-10-CM

## 2025-08-26 DIAGNOSIS — E29.1 TESTICULAR FAILURE: Primary | ICD-10-CM

## 2025-08-26 PROCEDURE — 80053 COMPREHEN METABOLIC PANEL: CPT

## 2025-08-26 PROCEDURE — 82670 ASSAY OF TOTAL ESTRADIOL: CPT

## 2025-08-26 PROCEDURE — 36415 COLL VENOUS BLD VENIPUNCTURE: CPT

## 2025-08-26 PROCEDURE — 85025 COMPLETE CBC W/AUTO DIFF WBC: CPT

## 2025-08-26 PROCEDURE — 84403 ASSAY OF TOTAL TESTOSTERONE: CPT

## 2025-08-26 PROCEDURE — 84402 ASSAY OF FREE TESTOSTERONE: CPT

## 2025-08-27 LAB
ALBUMIN SERPL-MCNC: 4.5 G/DL (ref 3.5–5.2)
ALBUMIN/GLOB SERPL: 1.3 G/DL
ALP SERPL-CCNC: 96 U/L (ref 39–117)
ALT SERPL W P-5'-P-CCNC: 37 U/L (ref 1–41)
ANION GAP SERPL CALCULATED.3IONS-SCNC: 11.9 MMOL/L (ref 5–15)
AST SERPL-CCNC: 28 U/L (ref 1–40)
BASOPHILS # BLD AUTO: 0.04 10*3/MM3 (ref 0–0.2)
BASOPHILS NFR BLD AUTO: 0.5 % (ref 0–1.5)
BILIRUB SERPL-MCNC: 0.5 MG/DL (ref 0–1.2)
BUN SERPL-MCNC: 9 MG/DL (ref 6–20)
BUN/CREAT SERPL: 7.6 (ref 7–25)
CALCIUM SPEC-SCNC: 10.4 MG/DL (ref 8.6–10.5)
CHLORIDE SERPL-SCNC: 104 MMOL/L (ref 98–107)
CO2 SERPL-SCNC: 24.1 MMOL/L (ref 22–29)
CREAT SERPL-MCNC: 1.19 MG/DL (ref 0.76–1.27)
DEPRECATED RDW RBC AUTO: 44.2 FL (ref 37–54)
EGFRCR SERPLBLD CKD-EPI 2021: 74.4 ML/MIN/1.73
EOSINOPHIL # BLD AUTO: 0.11 10*3/MM3 (ref 0–0.4)
EOSINOPHIL NFR BLD AUTO: 1.3 % (ref 0.3–6.2)
ERYTHROCYTE [DISTWIDTH] IN BLOOD BY AUTOMATED COUNT: 13.1 % (ref 12.3–15.4)
ESTRADIOL SERPL HS-MCNC: 52.4 PG/ML
GLOBULIN UR ELPH-MCNC: 3.5 GM/DL
GLUCOSE SERPL-MCNC: 95 MG/DL (ref 65–99)
HCT VFR BLD AUTO: 49.9 % (ref 37.5–51)
HGB BLD-MCNC: 16.4 G/DL (ref 13–17.7)
IMM GRANULOCYTES # BLD AUTO: 0.02 10*3/MM3 (ref 0–0.05)
IMM GRANULOCYTES NFR BLD AUTO: 0.2 % (ref 0–0.5)
LYMPHOCYTES # BLD AUTO: 1.77 10*3/MM3 (ref 0.7–3.1)
LYMPHOCYTES NFR BLD AUTO: 21.2 % (ref 19.6–45.3)
MCH RBC QN AUTO: 30.3 PG (ref 26.6–33)
MCHC RBC AUTO-ENTMCNC: 32.9 G/DL (ref 31.5–35.7)
MCV RBC AUTO: 92.2 FL (ref 79–97)
MONOCYTES # BLD AUTO: 0.57 10*3/MM3 (ref 0.1–0.9)
MONOCYTES NFR BLD AUTO: 6.8 % (ref 5–12)
NEUTROPHILS NFR BLD AUTO: 5.85 10*3/MM3 (ref 1.7–7)
NEUTROPHILS NFR BLD AUTO: 70 % (ref 42.7–76)
NRBC BLD AUTO-RTO: 0 /100 WBC (ref 0–0.2)
PLATELET # BLD AUTO: 256 10*3/MM3 (ref 140–450)
PMV BLD AUTO: 12 FL (ref 6–12)
POTASSIUM SERPL-SCNC: 4.8 MMOL/L (ref 3.5–5.2)
PROT SERPL-MCNC: 8 G/DL (ref 6–8.5)
RBC # BLD AUTO: 5.41 10*6/MM3 (ref 4.14–5.8)
SODIUM SERPL-SCNC: 140 MMOL/L (ref 136–145)
WBC NRBC COR # BLD AUTO: 8.36 10*3/MM3 (ref 3.4–10.8)